# Patient Record
Sex: FEMALE | Race: WHITE
[De-identification: names, ages, dates, MRNs, and addresses within clinical notes are randomized per-mention and may not be internally consistent; named-entity substitution may affect disease eponyms.]

---

## 2018-09-05 ENCOUNTER — HOSPITAL ENCOUNTER (EMERGENCY)
Dept: HOSPITAL 75 - ER | Age: 58
Discharge: HOME | End: 2018-09-05
Payer: COMMERCIAL

## 2018-09-05 VITALS — BODY MASS INDEX: 39.75 KG/M2 | WEIGHT: 216 LBS | HEIGHT: 62 IN

## 2018-09-05 VITALS — DIASTOLIC BLOOD PRESSURE: 74 MMHG | SYSTOLIC BLOOD PRESSURE: 124 MMHG

## 2018-09-05 DIAGNOSIS — R94.5: ICD-10-CM

## 2018-09-05 DIAGNOSIS — R60.0: Primary | ICD-10-CM

## 2018-09-05 LAB
ALBUMIN SERPL-MCNC: 3.2 GM/DL (ref 3.2–4.5)
ALP SERPL-CCNC: 118 U/L (ref 40–136)
ALT SERPL-CCNC: 61 U/L (ref 0–55)
BASOPHILS # BLD AUTO: 0 10^3/UL (ref 0–0.1)
BASOPHILS NFR BLD AUTO: 1 % (ref 0–10)
BILIRUB SERPL-MCNC: 1.2 MG/DL (ref 0.1–1)
BUN/CREAT SERPL: 20
CALCIUM SERPL-MCNC: 8.5 MG/DL (ref 8.5–10.1)
CHLORIDE SERPL-SCNC: 103 MMOL/L (ref 98–107)
CO2 SERPL-SCNC: 20 MMOL/L (ref 21–32)
CREAT SERPL-MCNC: 0.71 MG/DL (ref 0.6–1.3)
EOSINOPHIL # BLD AUTO: 0.2 10^3/UL (ref 0–0.3)
EOSINOPHIL NFR BLD AUTO: 3 % (ref 0–10)
ERYTHROCYTE [DISTWIDTH] IN BLOOD BY AUTOMATED COUNT: 19.6 % (ref 10–14.5)
GFR SERPLBLD BASED ON 1.73 SQ M-ARVRAT: > 60 ML/MIN
GLUCOSE SERPL-MCNC: 102 MG/DL (ref 70–105)
HCT VFR BLD CALC: 39 % (ref 35–52)
HGB BLD-MCNC: 12.7 G/DL (ref 11.5–16)
LYMPHOCYTES # BLD AUTO: 1.7 X 10^3 (ref 1–4)
LYMPHOCYTES NFR BLD AUTO: 24 % (ref 12–44)
MANUAL DIFFERENTIAL PERFORMED BLD QL: NO
MCH RBC QN AUTO: 28 PG (ref 25–34)
MCHC RBC AUTO-ENTMCNC: 33 G/DL (ref 32–36)
MCV RBC AUTO: 86 FL (ref 80–99)
MONOCYTES # BLD AUTO: 0.5 X 10^3 (ref 0–1)
MONOCYTES NFR BLD AUTO: 7 % (ref 0–12)
NEUTROPHILS # BLD AUTO: 4.6 X 10^3 (ref 1.8–7.8)
NEUTROPHILS NFR BLD AUTO: 66 % (ref 42–75)
PLATELET # BLD: 145 10^3/UL (ref 130–400)
PMV BLD AUTO: 10.3 FL (ref 7.4–10.4)
POTASSIUM SERPL-SCNC: 3.7 MMOL/L (ref 3.6–5)
PROT SERPL-MCNC: 6.7 GM/DL (ref 6.4–8.2)
RBC # BLD AUTO: 4.51 10^6/UL (ref 4.35–5.85)
SODIUM SERPL-SCNC: 136 MMOL/L (ref 135–145)
T4 FREE SERPL-MCNC: 1.03 NG/DL (ref 0.7–1.48)
WBC # BLD AUTO: 7 10^3/UL (ref 4.3–11)

## 2018-09-05 PROCEDURE — 36415 COLL VENOUS BLD VENIPUNCTURE: CPT

## 2018-09-05 PROCEDURE — 80053 COMPREHEN METABOLIC PANEL: CPT

## 2018-09-05 PROCEDURE — 71045 X-RAY EXAM CHEST 1 VIEW: CPT

## 2018-09-05 PROCEDURE — 84439 ASSAY OF FREE THYROXINE: CPT

## 2018-09-05 PROCEDURE — 93970 EXTREMITY STUDY: CPT

## 2018-09-05 PROCEDURE — 85025 COMPLETE CBC W/AUTO DIFF WBC: CPT

## 2018-09-05 PROCEDURE — 80320 DRUG SCREEN QUANTALCOHOLS: CPT

## 2018-09-05 PROCEDURE — 83880 ASSAY OF NATRIURETIC PEPTIDE: CPT

## 2018-09-05 PROCEDURE — 84443 ASSAY THYROID STIM HORMONE: CPT

## 2018-09-05 PROCEDURE — 93005 ELECTROCARDIOGRAM TRACING: CPT

## 2018-09-05 NOTE — DIAGNOSTIC IMAGING REPORT
INDICATION: Lower extremity swelling.



COMPARISON: None.



FINDINGS: Single view of the chest demonstrates minimal cardiac

enlargement. The lungs are clear. There is no pneumothorax. The

osseous structures normal.



IMPRESSION: Minimal cardiac enlargement without pulmonary edema

or infiltrate.



Dictated by: 



  Dictated on workstation # [id number]

## 2018-09-05 NOTE — ED LOWER EXTREMITY
General


Stated Complaint:  BILAT LEG SWELLING


Source:  patient, family, EMS


Exam Limitations:  no limitations





History of Present Illness


Date Seen by Provider:  Sep 5, 2018


Time Seen by Provider:  17:56


Initial Comments


To ER per EMS from home with reports of bilateral lower actually swelling for 

the past 4 days. She reports chronic shortness of breath. She just moved here 

to [place] week ago. Prior to this she lived in [place] and prior to that 

she lived in [place]. She had this once before when she lived in [place], was given 

a short course of Lasix and the swelling improved.


Onset:  just prior to arrival


Severity:  moderate


Pain/Injury Location:  bilateral leg


Modifying Factors:  Worse With Movement





Allergies and Home Medications


Home Medications


Furosemide 40 Mg Tablet, 40 MG PO DAILY


   Prescribed by: MUNA CASILLAS on 18 1850





Patient Home Medication List


Home Medication List Reviewed:  Yes





Review of Systems


Constitutional:  see HPI


EENTM:  see HPI


Respiratory:  see HPI


Cardiovascular:  no symptoms reported


Genitourinary:  no symptoms reported


Musculoskeletal:  see HPI


Skin:  no symptoms reported


Psychiatric/Neurological:  No Symptoms Reported (1 the)





Physical Exam


Vital Signs





Vital Signs - First Documented








 18





 17:58


 


Temp 97.9


 


Pulse 94


 


Resp 20


 


B/P (MAP) 124/74 (91)


 


Pulse Ox 96


 


O2 Delivery Room Air





Capillary Refill :


Height, Weight, BMI


Height: '"


Weight: lbs. oz. kg;  BMI


Method:


General Appearance:  WD/WN, no apparent distress


HEENT:  PERRL/EOMI, normal ENT inspection


Neck:  non-tender, full range of motion


Cardiovascular:  regular rate, rhythm, no murmur


Respiratory:  no respiratory distress, no accessory muscle use


Gastrointestinal:  normal bowel sounds, non tender, soft


Hips:  bilateral hip non-tender, bilateral hip normal inspection, bilateral hip 

normal range of motion


Legs:  bilateral leg non-tender, bilateral leg normal inspection, bilateral leg 

normal range of motion


Knees:  bilateral knee non-tender, bilateral knee normal inspection, bilateral 

knee normal range of motion


Ankles:  left ankle swelling (2+BLE)


Feet:  bilateral foot non-tender, bilateral foot normal inspection, bilateral 

foot normal range of motion, bilateral foot other (dorsalis pedis pulse +2 

bilaterally)


Neurologic/Psychiatric:  alert, normal mood/affect, oriented x 3


Skin:  normal color, warm/dry





Progress/Results/Core Measures


Results/Orders


Lab Results





Laboratory Tests








Test


 18


18:12 Range/Units


 


 


White Blood Count


 7.0 


 4.3-11.0


10^3/uL


 


Red Blood Count


 4.51 


 4.35-5.85


10^6/uL


 


Hemoglobin 12.7  11.5-16.0  G/DL


 


Hematocrit 39  35-52  %


 


Mean Corpuscular Volume 86  80-99  FL


 


Mean Corpuscular Hemoglobin 28  25-34  PG


 


Mean Corpuscular Hemoglobin


Concent 33 


 32-36  G/DL





 


Red Cell Distribution Width 19.6 H 10.0-14.5  %


 


Platelet Count


 145 


 130-400


10^3/uL


 


Mean Platelet Volume 10.3  7.4-10.4  FL


 


Neutrophils (%) (Auto) 66  42-75  %


 


Lymphocytes (%) (Auto) 24  12-44  %


 


Monocytes (%) (Auto) 7  0-12  %


 


Eosinophils (%) (Auto) 3  0-10  %


 


Basophils (%) (Auto) 1  0-10  %


 


Neutrophils # (Auto) 4.6  1.8-7.8  X 10^3


 


Lymphocytes # (Auto) 1.7  1.0-4.0  X 10^3


 


Monocytes # (Auto) 0.5  0.0-1.0  X 10^3


 


Eosinophils # (Auto)


 0.2 


 0.0-0.3


10^3/uL


 


Basophils # (Auto)


 0.0 


 0.0-0.1


10^3/uL


 


Sodium Level 136  135-145  MMOL/L


 


Potassium Level 3.7  3.6-5.0  MMOL/L


 


Chloride Level 103    MMOL/L


 


Carbon Dioxide Level 20 L 21-32  MMOL/L


 


Anion Gap 13  5-14  MMOL/L


 


Blood Urea Nitrogen 14  7-18  MG/DL


 


Creatinine


 0.71 


 0.60-1.30


MG/DL


 


Estimat Glomerular Filtration


Rate > 60 


  





 


BUN/Creatinine Ratio 20   


 


Glucose Level 102    MG/DL


 


Calcium Level 8.5  8.5-10.1  MG/DL


 


Corrected Calcium 9.1  8.5-10.1  MG/DL


 


Total Bilirubin 1.2 H 0.1-1.0  MG/DL


 


Aspartate Amino Transf


(AST/SGOT) 107 H


 5-34  U/L





 


Alanine Aminotransferase


(ALT/SGPT) 61 H


 0-55  U/L





 


Alkaline Phosphatase 118    U/L


 


B-Type Natriuretic Peptide 122.6 H <100.0  PG/ML


 


Total Protein 6.7  6.4-8.2  GM/DL


 


Albumin 3.2  3.2-4.5  GM/DL


 


Thyroid Stimulating Hormone


(TSH) 1.39 


 0.35-4.94


UIU/ML


 


Free Thyroxine


 1.03 


 0.70-1.48


NG/DL


 


Serum Alcohol < 10  <10  MG/DL








My Orders





Orders - MUNA CASILLAS


BNP (18 17:55)


Thyroid Stimulating Hormone (18 17:55)


Free T4 (Free Thyroxine) (18 17:55)


Ekg Tracing (18 17:55)


Chest 1 View, Ap/Pa Only (18 17:55)


Cbc With Automated Diff (18 17:55)


Comprehensive Metabolic Panel (18 17:55)


Us Venous Lower Ext Daniel (18 17:55)


Iv Heplock-Insert (Order) (18 17:55)


Alcohol (18 18:46)





Vital Signs/I&O











 18





 17:58


 


Temp 97.9


 


Pulse 94


 


Resp 20


 


B/P (MAP) 124/74 (91)


 


Pulse Ox 96


 


O2 Delivery Room Air











Diagnostic Imaging





   Diagonstic Imaging:  Xray


Comments


NAME:   KIT HARGROVE


[facility] REC#:   [id number]


ACCOUNT#:   [id number]


PT STATUS:   REG ER


:   1960


PHYSICIAN:   MUNA CASILLAS


ADMIT DATE:   18/ER


 ***Signed***


Date of Exam:18





CHEST 1 VIEW, AP/PA ONLY








INDICATION: Lower extremity swelling.





COMPARISON: None.





FINDINGS: Single view of the chest demonstrates minimal cardiac


enlargement. The lungs are clear. There is no pneumothorax. The


osseous structures normal.





IMPRESSION: Minimal cardiac enlargement without pulmonary edema


or infiltrate.





Dictated by: 





  Dictated on workstation # [id number]








Dict:   18


Trans:   18


 [id number]





Interpreted by:     FLAKITO JACOBS


Electronically signed by: FLAKITO JACOBS 18





Departure


Impression





 Primary Impression:  


 Pedal edema


 Additional Impression:  


 Elevated LFTs


Disposition:  01 HOME, SELF-CARE


Condition:  Stable





Departure-Patient Inst.


Decision time for Depature:  18:47


Referrals:  


UNKNOWN (PCP/Family)


Primary Care Physician


Patient Instructions:  Dependent Edema (DC)





Add. Discharge Instructions:  


1. A list of local physicians is been provided to you. Call one of these to 

make an appointment to be seen within the next week. Elevate your legs as much 

as possible. Take the diuretic once daily for the next 3 days starting 

tomorrow. Follow-up with a physician to elevate your slightly elevated liver 

studies.


Scripts


Furosemide (Lasix) 40 Mg Tablet


40 MG PO DAILY, #3 TAB


   Prov: MUNA CASILLAS         18


Work/School Note:  Local Medical Staff Listing











MUNA CASILLAS Sep 5, 2018 17:58

## 2018-09-05 NOTE — DIAGNOSTIC IMAGING REPORT
EXAM:  US VENOUS LOWER EXT SHAI



INDICATION:  Bilateral lower extremity edema.



COMPARISON:  None.



TECHNIQUE:  Duplex, gray-scale and color-flow imaging of the

bilateral lower extremities venous system was performed



FINDINGS:  The bilateral common femoral vein, superficial femoral

vein, profunda femoris, and popliteal veins are normal.  These

vessels show normal compressibility, color flow, and doppler

augmentation.  The deep calf veins demonstrate no distinct

intraluminal thrombus where seen. 



IMPRESSION:  Negative venous Doppler of the  bilateral lower

extremities.



Dictated by: 



  Dictated on workstation # [id number]

## 2018-09-15 ENCOUNTER — HOSPITAL ENCOUNTER (INPATIENT)
Dept: HOSPITAL 75 - ER | Age: 58
LOS: 5 days | Discharge: HOME | DRG: 871 | End: 2018-09-20
Attending: INTERNAL MEDICINE | Admitting: INTERNAL MEDICINE
Payer: COMMERCIAL

## 2018-09-15 VITALS — DIASTOLIC BLOOD PRESSURE: 69 MMHG | SYSTOLIC BLOOD PRESSURE: 120 MMHG

## 2018-09-15 VITALS — HEIGHT: 62 IN | WEIGHT: 226 LBS | BODY MASS INDEX: 41.59 KG/M2

## 2018-09-15 VITALS — SYSTOLIC BLOOD PRESSURE: 122 MMHG | DIASTOLIC BLOOD PRESSURE: 70 MMHG

## 2018-09-15 DIAGNOSIS — R18.8: ICD-10-CM
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
## 2018-09-15 DIAGNOSIS — F32.9: ICD-10-CM

## 2018-09-15 DIAGNOSIS — K76.6: ICD-10-CM

## 2018-09-15 DIAGNOSIS — R53.81: ICD-10-CM

## 2018-09-15 DIAGNOSIS — R29.6: ICD-10-CM

## 2018-09-15 DIAGNOSIS — J44.9: ICD-10-CM

## 2018-09-15 DIAGNOSIS — A40.3: Primary | ICD-10-CM

## 2018-09-15 DIAGNOSIS — L30.9: ICD-10-CM

## 2018-09-15 DIAGNOSIS — I10: ICD-10-CM

## 2018-09-15 DIAGNOSIS — K74.60: ICD-10-CM

## 2018-09-15 DIAGNOSIS — E66.9: ICD-10-CM

## 2018-09-15 DIAGNOSIS — K44.9: ICD-10-CM

## 2018-09-15 DIAGNOSIS — Z79.4: ICD-10-CM

## 2018-09-15 DIAGNOSIS — R51: ICD-10-CM

## 2018-09-15 DIAGNOSIS — F12.90: ICD-10-CM

## 2018-09-15 DIAGNOSIS — K29.70: ICD-10-CM

## 2018-09-15 DIAGNOSIS — D69.59: ICD-10-CM

## 2018-09-15 DIAGNOSIS — R35.0: ICD-10-CM

## 2018-09-15 DIAGNOSIS — B19.10: ICD-10-CM

## 2018-09-15 DIAGNOSIS — F41.9: ICD-10-CM

## 2018-09-15 DIAGNOSIS — D18.09: ICD-10-CM

## 2018-09-15 DIAGNOSIS — S00.83XA: ICD-10-CM

## 2018-09-15 DIAGNOSIS — K65.2: ICD-10-CM

## 2018-09-15 DIAGNOSIS — E11.9: ICD-10-CM

## 2018-09-15 LAB
ALBUMIN FLD-MCNC: 0.5 G/DL
ALBUMIN SERPL-MCNC: 3.2 GM/DL (ref 3.2–4.5)
ALP SERPL-CCNC: 129 U/L (ref 40–136)
ALT SERPL-CCNC: 66 U/L (ref 0–55)
ANISOCYTOSIS BLD QL SMEAR: SLIGHT
APPEARANCE FLD: (no result)
APTT PPP: (no result) S
BACTERIA #/AREA URNS HPF: (no result) /HPF
BASOPHILS # BLD AUTO: 0 10^3/UL (ref 0–0.1)
BASOPHILS NFR BLD AUTO: 0 % (ref 0–10)
BASOPHILS NFR BLD MANUAL: 0 %
BILIRUB SERPL-MCNC: 1.8 MG/DL (ref 0.1–1)
BILIRUB UR QL STRIP: (no result)
BODY FLUID SOURCE: (no result)
BUN/CREAT SERPL: 20
CALCIUM SERPL-MCNC: 9 MG/DL (ref 8.5–10.1)
CHLORIDE SERPL-SCNC: 99 MMOL/L (ref 98–107)
CO2 SERPL-SCNC: 21 MMOL/L (ref 21–32)
COLOR FLD: YELLOW
CREAT SERPL-MCNC: 0.9 MG/DL (ref 0.6–1.3)
EOSINOPHIL # BLD AUTO: 0 10^3/UL (ref 0–0.3)
EOSINOPHIL NFR BLD AUTO: 0 % (ref 0–10)
EOSINOPHIL NFR BLD MANUAL: 0 %
ERYTHROCYTE [DISTWIDTH] IN BLOOD BY AUTOMATED COUNT: 20.1 % (ref 10–14.5)
FIBRINOGEN PPP-MCNC: (no result) MG/DL
GFR SERPLBLD BASED ON 1.73 SQ M-ARVRAT: > 60 ML/MIN
GLUCOSE FLD-MCNC: 175 MG/DL
GLUCOSE SERPL-MCNC: 158 MG/DL (ref 70–105)
GLUCOSE UR STRIP-MCNC: NEGATIVE MG/DL
HCT VFR BLD CALC: 39 % (ref 35–52)
HGB BLD-MCNC: 12.9 G/DL (ref 11.5–16)
HYALINE CASTS #/AREA URNS LPF: (no result) /LPF
INR PPP: 1.5 (ref 0.8–1.4)
KETONES UR QL STRIP: (no result)
LDH FLD-CCNC: 66 U/L
LEUKOCYTE ESTERASE UR QL STRIP: (no result)
LYMPHOCYTES # BLD AUTO: 0.9 X 10^3 (ref 1–4)
LYMPHOCYTES NFR BLD AUTO: 5 % (ref 12–44)
LYMPHOCYTES NFR FLD MANUAL: 12 %
MANUAL DIFFERENTIAL PERFORMED BLD QL: YES
MCH RBC QN AUTO: 28 PG (ref 25–34)
MCHC RBC AUTO-ENTMCNC: 33 G/DL (ref 32–36)
MCV RBC AUTO: 86 FL (ref 80–99)
MONOCYTES # BLD AUTO: 0.9 X 10^3 (ref 0–1)
MONOCYTES NFR BLD AUTO: 5 % (ref 0–12)
MONOCYTES NFR BLD: 7 %
NEUTROPHILS # BLD AUTO: 16.3 X 10^3 (ref 1.8–7.8)
NEUTROPHILS NFR BLD AUTO: 90 % (ref 42–75)
NEUTS BAND NFR BLD MANUAL: 79 %
NEUTS BAND NFR BLD: 5 %
NITRITE UR QL STRIP: NEGATIVE
PH UR STRIP: 6 [PH] (ref 5–9)
PLATELET # BLD: 137 10^3/UL (ref 130–400)
PMV BLD AUTO: 10.9 FL (ref 7.4–10.4)
POTASSIUM SERPL-SCNC: 3.6 MMOL/L (ref 3.6–5)
PROT FLD-MCNC: 0.8 G/DL
PROT SERPL-MCNC: 7.1 GM/DL (ref 6.4–8.2)
PROT UR QL STRIP: (no result)
PROTHROMBIN TIME: 17.9 SEC (ref 12.2–14.7)
RBC # BLD AUTO: 4.57 10^6/UL (ref 4.35–5.85)
RBC # FLD: 41 /UL
RBC #/AREA URNS HPF: (no result) /HPF
SODIUM SERPL-SCNC: 134 MMOL/L (ref 135–145)
SP GR UR STRIP: 1.02 (ref 1.02–1.02)
SQUAMOUS #/AREA URNS HPF: (no result) /HPF
UROBILINOGEN UR-MCNC: 4 MG/DL
VARIANT LYMPHS NFR BLD MANUAL: 9 %
WBC # BLD AUTO: 18 10^3/UL (ref 4.3–11)
WBC # FLD: 479 /UL
WBC #/AREA URNS HPF: (no result) /HPF

## 2018-09-15 PROCEDURE — 89051 BODY FLUID CELL COUNT: CPT

## 2018-09-15 PROCEDURE — 84157 ASSAY OF PROTEIN OTHER: CPT

## 2018-09-15 PROCEDURE — 85007 BL SMEAR W/DIFF WBC COUNT: CPT

## 2018-09-15 PROCEDURE — 71045 X-RAY EXAM CHEST 1 VIEW: CPT

## 2018-09-15 PROCEDURE — 86706 HEP B SURFACE ANTIBODY: CPT

## 2018-09-15 PROCEDURE — 87077 CULTURE AEROBIC IDENTIFY: CPT

## 2018-09-15 PROCEDURE — 82945 GLUCOSE OTHER FLUID: CPT

## 2018-09-15 PROCEDURE — 85025 COMPLETE CBC W/AUTO DIFF WBC: CPT

## 2018-09-15 PROCEDURE — 86803 HEPATITIS C AB TEST: CPT

## 2018-09-15 PROCEDURE — 82140 ASSAY OF AMMONIA: CPT

## 2018-09-15 PROCEDURE — 81000 URINALYSIS NONAUTO W/SCOPE: CPT

## 2018-09-15 PROCEDURE — 96375 TX/PRO/DX INJ NEW DRUG ADDON: CPT

## 2018-09-15 PROCEDURE — 85027 COMPLETE CBC AUTOMATED: CPT

## 2018-09-15 PROCEDURE — 85610 PROTHROMBIN TIME: CPT

## 2018-09-15 PROCEDURE — 74177 CT ABD & PELVIS W/CONTRAST: CPT

## 2018-09-15 PROCEDURE — 87070 CULTURE OTHR SPECIMN AEROBIC: CPT

## 2018-09-15 PROCEDURE — 80048 BASIC METABOLIC PNL TOTAL CA: CPT

## 2018-09-15 PROCEDURE — 87205 SMEAR GRAM STAIN: CPT

## 2018-09-15 PROCEDURE — 36415 COLL VENOUS BLD VENIPUNCTURE: CPT

## 2018-09-15 PROCEDURE — 87040 BLOOD CULTURE FOR BACTERIA: CPT

## 2018-09-15 PROCEDURE — 87184 SC STD DISK METHOD PER PLATE: CPT

## 2018-09-15 PROCEDURE — 70450 CT HEAD/BRAIN W/O DYE: CPT

## 2018-09-15 PROCEDURE — 72125 CT NECK SPINE W/O DYE: CPT

## 2018-09-15 PROCEDURE — 83605 ASSAY OF LACTIC ACID: CPT

## 2018-09-15 PROCEDURE — 96365 THER/PROPH/DIAG IV INF INIT: CPT

## 2018-09-15 PROCEDURE — 0W9G3ZX DRAINAGE OF PERITONEAL CAVITY, PERCUTANEOUS APPROACH, DIAGNOSTIC: ICD-10-PCS | Performed by: SURGERY

## 2018-09-15 PROCEDURE — 82042 OTHER SOURCE ALBUMIN QUAN EA: CPT

## 2018-09-15 PROCEDURE — 87340 HEPATITIS B SURFACE AG IA: CPT

## 2018-09-15 PROCEDURE — 80053 COMPREHEN METABOLIC PANEL: CPT

## 2018-09-15 PROCEDURE — 82962 GLUCOSE BLOOD TEST: CPT

## 2018-09-15 PROCEDURE — 83615 LACTATE (LD) (LDH) ENZYME: CPT

## 2018-09-15 RX ADMIN — SODIUM CHLORIDE SCH MLS/HR: 900 INJECTION, SOLUTION INTRAVENOUS at 18:32

## 2018-09-15 RX ADMIN — PANTOPRAZOLE SODIUM SCH MG: 40 INJECTION, POWDER, FOR SOLUTION INTRAVENOUS at 20:36

## 2018-09-15 RX ADMIN — Medication SCH ML: at 22:31

## 2018-09-15 RX ADMIN — FENTANYL CITRATE PRN MCG: 50 INJECTION, SOLUTION INTRAMUSCULAR; INTRAVENOUS at 23:06

## 2018-09-15 RX ADMIN — MICONAZOLE NITRATE SCH GM: 20 POWDER TOPICAL at 22:32

## 2018-09-15 RX ADMIN — IBUPROFEN PRN MG: 800 TABLET, FILM COATED ORAL at 20:36

## 2018-09-15 RX ADMIN — FENTANYL CITRATE PRN MCG: 50 INJECTION, SOLUTION INTRAMUSCULAR; INTRAVENOUS at 20:36

## 2018-09-15 NOTE — DIAGNOSTIC IMAGING REPORT
EXAM: Chest 1 view, AP/PA only.



INDICATION: Fall.



COMPARISON: Chest radiograph, 9/5/2018.



FINDINGS: Low lung volumes. Mild atelectasis or infiltrate in the

right lung base. Normal heart size and pulmonary vascularity. No

pleural effusion or pneumothorax. Postoperative changes in the

lower cervical spine. No acute osseous findings.



IMPRESSION: 

1. Low lung volumes with mild atelectasis or infiltrate in the

right lung base.

2. Remainder unremarkable. 



Dictated by: 



  Dictated on workstation # [id number]

## 2018-09-15 NOTE — DIAGNOSTIC IMAGING REPORT
PROCEDURE: CT head and CT cervical spine without contrast.



TECHNIQUE: Multiple contiguous axial images were obtained through

the brain and cervical spine without the use of intravenous

contrast. Sagittal and coronal reformations through the cervical

spine were then performed.



INDICATION: Fall. Head injury. 



COMPARISON: None.



FINDINGS: Examination is limited by motion artifact.



CT head: No CT evidence for territorial infarction. No

intracranial hemorrhage, mass effect, hydrocephalus or

extra-axial fluid collection. Intracranial vascular

calcifications. Osseous structures are intact. The paranasal

sinuses and mastoids are clear.



CT cervical spine: There are postoperative findings of an

anterior fusion with interbody bone grafting at C4-C6. Normal

alignment. Vertebral body heights are preserved. No fractures.

Advanced degenerative endplate changes at C3-C4 and C6-C7.

Osteophytic ridging appears to result in at least mild, possibly

moderate spinal canal narrowing at C5-C6. There is diffuse

moderate neural foraminal narrowing. Advanced atherosclerotic

calcifications in the carotid bifurcations, left greater than

right. The lung apices are clear.



IMPRESSION: 

1. Examination is limited by motion artifact.

2. Given the limitations, no acute intracranial or cervical spine

CT findings. Chronic findings as above.



Dictated by: 



  Dictated on workstation # [id number]

## 2018-09-15 NOTE — CONSULTATION
History of Present Illness


History of Present Illness


Patient Consulted On(richi/time)


9/15/18


 21:38


Date Seen by Provider:  Sep 15, 2018


Time Seen by Provider:  14:00


History of Present Illness


seen and evaluated in ED..





Consult requested by Nikhil Matthew for paracentesis, abdominal pain.





Patient is a 58 year old female who has not felt well last 4-5 days.  SHe 

states that she has has several falls and had another one prior to coming 

today.  Patient states last day she has been having fever.  She has abdominal 

pain thats all over.  DOes not move anywhere.  Nothing makes better and nothing 

makes worse.  Patient has severe headache. She has noticed increased abdominal 

girth.  She had a ct scan which was reviewed, and demonstrating  stomach 

changes with inflammation suggestive of gastritis, and maybe intramural air or 

possible neoplasm stomach/duodenum, moderate ascites.  Patient states has 

hepatitis, no local physician.





Allergies and Home Medications


Allergies


Coded Allergies:  


     Penicillins (Verified  Allergy, Unknown, 9/15/18)


     doxycycline (Verified  Allergy, Unknown, 9/15/18)


     levofloxacin (Verified  Allergy, Unknown, 9/15/18)





Home Medications


Adalimumab 40 Mg/0.4 Ml Pen.ij.kit, 40 MG SQ Sa, (Reported)


Albuterol Sulfate 1 Puff Puff, 2 PUFF IH TID PRN for SHORTNESS OF BREATH, (

Reported)


   1 PUFF = 90 MCG 


Amitriptyline HCl 50 Mg Tablet, 50 MG PO HS, (Reported)


Aspirin 81 Mg Tablet.dr, 81 MG PO DAILY, (Reported)


Buspirone HCl 15 Mg Tablet, 15 MG PO BID, (Reported)


Cefdinir 300 Mg Capsule, 300 MG PO BID


   Prescribed by: JOHN SLADE on 18 1150


Donepezil HCl 10 Mg Tablet, 10 MG PO DAILY, (Reported)


Hydrocodone/Acetaminophen 1 Each Tablet, 1 TAB PO QID PRN for PAIN-MODERATE


   Prescribed by: JOHN SLADE on 18 1537


Leflunomide 20 Mg Tablet, 20 MG PO Q48H, (Reported)


Losartan Potassium 25 Mg Tablet, 25 MG PO DAILY, (Reported)


Lovastatin 40 Mg Tablet, 40 MG PO HS, (Reported)


Pantoprazole Sodium 20 Mg Tablet.dr, 20 MG PO DAILY, (Reported)


Promethazine HCl 25 Mg Tablet, 25 MG PO Q6H PRN for NAUSEA/VOMITING-1ST LINE, (

Reported)


Rizatriptan Benzoate 10 Mg Tablet, 10 MG PO UD PRN for MIGRAINE, (Reported)


Sertraline HCl 100 Mg Tablet, 150 MG PO DAILY, (Reported)


   TAKES 1 & 1/2 (100MG) TABLETS 





Patient Home Medication List


Home Medication List Reviewed:  Yes





Past Medical-Social-Family Hx


Patient Social History


Alcohol Use:  Denies Use


Recreational Drug Use:  Yes (SMOKES MARIJUANA DAILY)


Drug of Choice:  MARIJUANA


Smoking Status:  Never a Smoker


Type Used:  Cigarettes


Recent Foreign Travel:  No


Contact w/Someone Who Travel:  No


Recent Infectious Disease Expo:  No


Recent Hopitalizations:  No


Physical Abuse Screen:  No


Sexual Abuse:  No





Seasonal Allergies


Seasonal Allergies:  No





Surgeries


History of Surgeries:  Yes (BI LAT KNEES, RT FOOT, NECK)


Surgeries:   Section, Orthopedic





Respiratory


History of Respiratory Disorde:  Yes


Respiratory Disorders:  COPD





Cardiovascular


History of Cardiac Disorders:  Yes


Cardiac Disorders:  Hypertension





Neurological


History of Neurological Disord:  No





Genitourinary


History of Genitourinary Disor:  Yes (FREQUENCY)





Gastrointestinal


History of Gastrointestinal Di:  Yes (Hepatitis B)


Gastrointestinal Disorders:  Hepatitis





Musculoskeletal


History of Musculoskeletal Dis:  No





Endocrine


History of Endocrine Disorders:  Yes


Endocrine Disorders:  Diabetes, Insulin dep





HEENT


History of HEENT Disorders:  No


Loss of Vision:  Denies


Hearing Impairment:  Denies





Cancer


History of Cancer:  No





Psychosocial


History of Psychiatric Problem:  Yes


Behavioral Health Disorders:  Anxiety, Depression





Integumentary


History of Skin or Integumenta:  Yes


Skin/Integumentary Disorders:  Eczema





Family Medical History


Significant Family History:  No Pertinent Family Hx





Review of Systems-General


Constitutional:  see HPI


EENTM:  no symptoms reported


Respiratory:  no symptoms reported


Cardiovascular:  no symptoms reported


Gastrointestinal:  see HPI


Genitourinary:  no symptoms reported


Musculoskeletal:  no symptoms reported


Skin:  no symptoms reported


Psychiatric/Neurological:  No Symptoms Reported





Physical Exam-General Problems


Physical Exam


Vital Signs





Vital Signs - First Documented




















Capillary Refill : Less Than 3 SecondsLess Than 3 Seconds


General Appearance:  no apparent distress (laying in bed)


HEENT:  PERRL/EOMI


Neck:  supple, normal inspection


Respiratory:  no respiratory distress, no accessory muscle use


Cardiovascular:  regular rate, rhythm


Gastrointestinal:  distended, other (fluid wave, tenderness left upper quadrant.

)


Rectal:  deferred


Back:  normal inspection


Extremities:  non-tender


Neurologic/Psychiatric:  CNs II-XII nml as tested, no motor/sensory deficits, 

alert, normal mood/affect, oriented x 3


Skin:  normal color, warm/dry


Lymphatic:  no adenopathy





Data Review


Labs


Laboratory Tests


9/15/18 13:30: 


Urine Color AMBERH, Urine Clarity SLIGHTLY CLOUDY, Urine pH 6, Urine Specific 

Gravity 1.020, Urine Protein 2+H, Urine Glucose (UA) NEGATIVE, Urine Ketones 1+H

, Urine Nitrite NEGATIVE, Urine Bilirubin 2+H, Urine Urobilinogen 4H, Urine 

Leukocyte Esterase 1+H, Urine RBC (Auto) 3+H, Urine RBC 5-10H, Urine WBC 2-5, 

Urine Squamous Epithelial Cells 2-5, Urine Crystals NONE, Urine Bacteria FEWH, 

Urine Casts PRESENT, Urine Hyaline Casts 2-5H, Urine Mucus MODERATEH, Urine 

Culture Indicated NO


9/15/18 13:48: 


White Blood Count 18.0H, Red Blood Count 4.57, Hemoglobin 12.9, Hematocrit 39, 

Mean Corpuscular Volume 86, Mean Corpuscular Hemoglobin 28, Mean Corpuscular 

Hemoglobin Concent 33, Red Cell Distribution Width 20.1H, Platelet Count 137, 

Mean Platelet Volume 10.9H, Neutrophils (%) (Auto) 90H, Lymphocytes (%) (Auto) 

5L, Monocytes (%) (Auto) 5, Eosinophils (%) (Auto) 0, Basophils (%) (Auto) 0, 

Neutrophils # (Auto) 16.3H, Lymphocytes # (Auto) 0.9L, Monocytes # (Auto) 0.9, 

Eosinophils # (Auto) 0.0, Basophils # (Auto) 0.0, Neutrophils % (Manual) 79, 

Lymphocytes % (Manual) 9, Monocytes % (Manual) 7, Eosinophils % (Manual) 0, 

Basophils % (Manual) 0, Band Neutrophils 5, Anisocytosis SLIGHT, Prothrombin 

Time 17.9H, INR Comment 1.5H, Sodium Level 134L, Potassium Level 3.6, Chloride 

Level 99, Carbon Dioxide Level 21, Anion Gap 14, Blood Urea Nitrogen 18, 

Creatinine 0.90, Estimat Glomerular Filtration Rate > 60, BUN/Creatinine Ratio 

20, Glucose Level 158H, Lactic Acid Level 3.23*H, Calcium Level 9.0, Corrected 

Calcium 9.6, Total Bilirubin 1.8H, Aspartate Amino Transf (AST/SGOT) 112H, 

Alanine Aminotransferase (ALT/SGPT) 66H, Alkaline Phosphatase 129, Total 

Protein 7.1, Albumin 3.2


9/15/18 16:10: 


Body Fluid Source PERITONEAL, Body Fluid Color YELLOW, Body Fluid Appearance 

HAZY, Body Fluid , Body Fluid RBC 41, Body Fluid Polynuclear WBCs 35, 

Body Fluid Mononuclear WBCs 53, Body Fluid Lymphocytes 12, Body Fluid Other 

Cells , Body Fluid Glucose 175, Body Fluid Total Protein 0.8, Body Fluid 

Albumin 0.5, Body Fluid Lactate Dehydrogenase 66


9/15/18 17:18: Lactic Acid Level 2.03*H





Assessment/Plan


LUQ Abdominal pain


ascites


gastritis versus neoplasm with possible intramural air by ct


Consideration sbp and asked to do paracentesis which was performed and took off 

1860 mL of straw colored fluid.


Protonix 40 mg bid IV


IV abx to cover for sbp


will follow


will need EGD  in near future to further evaluate.





Clinical Quality Measures


DVT/VTE Risk/Contraindication:


Risk Factor Score Per Nursin


RFS Level Per Nursing on Admit:  4+=Very High











DAVID ARANDA DO Sep 15, 2018 21:46

## 2018-09-15 NOTE — ED GENERAL
General


Stated Complaint:  FALL


Source of Information:  Patient


Exam Limitations:  No Limitations





History of Present Illness


Date Seen by Provider:  Sep 15, 2018


Time Seen by Provider:  13:46


Initial Comments


To ER per EMS from home with reports of frequent falls about 6 times over the 

past 4-5 days. She states that she always falls because she feels as though her 

legs give out on her. However she's been falling more recently. She just moved 

to the area a few months ago and does not have a primary care provider. 

Formerly from [facility]. She states that she did hit her head during 

one of the falls, has a severe headache. Also reports left upper abdominal 

pain. She reports her pain at 10 out of 10 in her head, abdomen, back and 

states that she always has pain.


Timing/Duration:  1-2 Days


Severity:  Moderate


Associated Systoms:  Headaches, Weakness





Allergies and Home Medications


Allergies


Coded Allergies:  


     Penicillins (Verified  Allergy, Unknown, 9/15/18)


     doxycycline (Verified  Allergy, Unknown, 9/15/18)


     levofloxacin (Verified  Allergy, Unknown, 9/15/18)





Home Medications


Furosemide 40 Mg Tablet, 40 MG PO DAILY


   Prescribed by: MUNA CASILLAS on 18 2705





Patient Home Medication List


Home Medication List Reviewed:  Yes





Review of Systems


Review of Systems


Constitutional:  see HPI


EENTM:  see HPI


Respiratory:  no symptoms reported


Cardiovascular:  no symptoms reported


Gastrointestinal:  abdominal pain


Genitourinary:  see HPI


Musculoskeletal:  no symptoms reported


Skin:  no symptoms reported


Psychiatric/Neurological:  No Symptoms Reported, Headache


Hematologic/Lymphatic:  No Symptoms Reported


Immunological/Allergic:  no symptoms reported





Past Medical-Social-Family Hx


Patient Social History


Type Used:  Cigarettes


Recent Hopitalizations:  No





Seasonal Allergies


Seasonal Allergies:  No





Past Medical History


Surgeries:  Yes (BI LAT KNEES, RT FOOT, NECK)


 Section, Orthopedic


Respiratory:  Yes


COPD


Cardiac:  Yes


Hypertension


Genitourinary:  Yes (FREQUENCY)


Gastrointestinal:  Yes (Hepatitis B)


Endocrine:  Yes


Diabetes, Insulin dep


Cancer:  No


Psychosocial:  Yes


Anxiety, Depression





Physical Exam


Vital Signs





Vital Signs - First Documented








 9/15/18





 13:33


 


Temp 101.1


 


Pulse 93


 


Resp 20


 


B/P (MAP) 145/77 (99)


 


Pulse Ox 94


 


O2 Delivery Room Air





Capillary Refill :


Height, Weight, BMI


Height: 5'2.00"


Weight: 216lbs. oz. 97.479498jj;  BMI


Method:Stated


General Appearance:  No Apparent Distress, WD/WN


Eyes:  Bilateral Eye Normal Inspection, Bilateral Eye PERRL


HEENT:  PERRL/EOMI, TMs Normal, Other (small ecchymosis to the right side of 

the forehead. No Winn sign or raccoon eyes)


Neck:  Full Range of Motion, Normal Inspection


Respiratory:  Normal Breath Sounds, No Accessory Muscle Use, No Respiratory 

Distress


Cardiovascular:  Regular Rate, Rhythm, Normal Peripheral Pulses


Gastrointestinal:  Normal Bowel Sounds, Non Tender, Soft


Extremity:  Normal Capillary Refill, Normal Inspection


Neurologic/Psychiatric:  Alert, Oriented x3, No Motor/Sensory Deficits


Skin:  Normal Color, Warm/Dry





Focused Exam


Lactate Level


9/15/18 13:48: Lactic Acid Level 3.23*H





Lactic Acid Level





Laboratory Tests








Test


 9/15/18


13:48


 


Lactic Acid Level


 3.23 MMOL/L


(0.50-2.00)  *H











Progress/Results/Core Measures


Suspected Sepsis


SIRS


Temperature: 


Pulse:  


Respiratory Rate: 


 


Laboratory Tests


9/15/18 13:48: White Blood Count 18.0H


Blood Pressure  / 


Mean: 


 





9/15/18 13:48: Lactic Acid Level 3.23*H


Laboratory Tests


9/15/18 13:48: 


Creatinine 0.90, INR Comment 1.5H, Platelet Count 137, Total Bilirubin 1.8H








Results/Orders


Lab Results





Laboratory Tests








Test


 9/15/18


13:30 9/15/18


13:48 9/15/18


16:10 Range/Units


 


 


Urine Color BILLY H    


 


Urine Clarity


 SLIGHTLY


CLOUDY 


 


  





 


Urine pH 6    5-9  


 


Urine Specific Gravity 1.020    1.016-1.022  


 


Urine Protein 2+ H   NEGATIVE  


 


Urine Glucose (UA) NEGATIVE    NEGATIVE  


 


Urine Ketones 1+ H   NEGATIVE  


 


Urine Nitrite NEGATIVE    NEGATIVE  


 


Urine Bilirubin 2+ H   NEGATIVE  


 


Urine Urobilinogen 4 H   NORMAL  MG/DL


 


Urine Leukocyte Esterase 1+ H   NEGATIVE  


 


Urine RBC (Auto) 3+ H   NEGATIVE  


 


Urine RBC 5-10 H    /HPF


 


Urine WBC 2-5     /HPF


 


Urine Squamous Epithelial


Cells 2-5 


 


 


  /HPF





 


Urine Crystals NONE     /LPF


 


Urine Bacteria FEW H    /HPF


 


Urine Casts PRESENT     /LPF


 


Urine Hyaline Casts 2-5 H    /LPF


 


Urine Mucus MODERATE H    /LPF


 


Urine Culture Indicated NO     


 


White Blood Count


 


 18.0 H


 


 4.3-11.0


10^3/uL


 


Red Blood Count


 


 4.57 


 


 4.35-5.85


10^6/uL


 


Hemoglobin  12.9   11.5-16.0  G/DL


 


Hematocrit  39   35-52  %


 


Mean Corpuscular Volume  86   80-99  FL


 


Mean Corpuscular Hemoglobin  28   25-34  PG


 


Mean Corpuscular Hemoglobin


Concent 


 33 


 


 32-36  G/DL





 


Red Cell Distribution Width  20.1 H  10.0-14.5  %


 


Platelet Count


 


 137 


 


 130-400


10^3/uL


 


Mean Platelet Volume  10.9 H  7.4-10.4  FL


 


Neutrophils (%) (Auto)  90 H  42-75  %


 


Lymphocytes (%) (Auto)  5 L  12-44  %


 


Monocytes (%) (Auto)  5   0-12  %


 


Eosinophils (%) (Auto)  0   0-10  %


 


Basophils (%) (Auto)  0   0-10  %


 


Neutrophils # (Auto)  16.3 H  1.8-7.8  X 10^3


 


Lymphocytes # (Auto)  0.9 L  1.0-4.0  X 10^3


 


Monocytes # (Auto)  0.9   0.0-1.0  X 10^3


 


Eosinophils # (Auto)


 


 0.0 


 


 0.0-0.3


10^3/uL


 


Basophils # (Auto)


 


 0.0 


 


 0.0-0.1


10^3/uL


 


Neutrophils % (Manual)  79    %


 


Lymphocytes % (Manual)  9    %


 


Monocytes % (Manual)  7    %


 


Eosinophils % (Manual)  0    %


 


Basophils % (Manual)  0    %


 


Band Neutrophils  5    %


 


Anisocytosis  SLIGHT    


 


Prothrombin Time  17.9 H  12.2-14.7  SEC


 


INR Comment  1.5 H  0.8-1.4  


 


Sodium Level  134 L  135-145  MMOL/L


 


Potassium Level  3.6   3.6-5.0  MMOL/L


 


Chloride Level  99     MMOL/L


 


Carbon Dioxide Level  21   21-32  MMOL/L


 


Anion Gap  14   5-14  MMOL/L


 


Blood Urea Nitrogen  18   7-18  MG/DL


 


Creatinine


 


 0.90 


 


 0.60-1.30


MG/DL


 


Estimat Glomerular Filtration


Rate 


 > 60 


 


  





 


BUN/Creatinine Ratio  20    


 


Glucose Level  158 H    MG/DL


 


Lactic Acid Level


 


 3.23 *H


 


 0.50-2.00


MMOL/L


 


Calcium Level  9.0   8.5-10.1  MG/DL


 


Corrected Calcium  9.6   8.5-10.1  MG/DL


 


Total Bilirubin  1.8 H  0.1-1.0  MG/DL


 


Aspartate Amino Transf


(AST/SGOT) 


 112 H


 


 5-34  U/L





 


Alanine Aminotransferase


(ALT/SGPT) 


 66 H


 


 0-55  U/L





 


Alkaline Phosphatase  129     U/L


 


Total Protein  7.1   6.4-8.2  GM/DL


 


Albumin  3.2   3.2-4.5  GM/DL








My Orders





Orders - MUNA CASILLAS


Cbc With Automated Diff (9/15/18 13:43)


Comprehensive Metabolic Panel (9/15/18 13:43)


Protime With Inr (9/15/18 13:43)


Ua Culture If Indicated (9/15/18 13:43)


Iv Heplock-Insert (Order) (9/15/18 13:43)


Chest 1 View, Ap/Pa Only (9/15/18 13:43)


Ct Head/Cervical Spine Wo (9/15/18 13:43)


Ct Abdomen/Pelvis W (9/15/18 13:43)


Blood Culture (9/15/18 13:43)


Lactic Acid Analyzer (9/15/18 13:43)


Acetaminophen  Tablet (Tylenol  Tablet) (9/15/18 13:45)


Iohexol Injection (Omnipaque 350 Mg/Ml 1 (9/15/18 14:00)


Sodium Chloride Flush (Catheter Flush Sy (9/15/18 14:00)


Pharmacy Communication (Pharmacy Communi (9/15/18 13:53)


Ns (Ivpb) (Sodium Chloride 0.9%) (9/15/18 14:00)


Manual Differential (9/15/18 13:48)


Ns Iv 1000 Ml (Sodium Chloride 0.9%) (9/15/18 14:30)


Ceftriaxone  For Iv Use (Rocephin  For I (9/15/18 15:45)


Lidocaine 1% Inj 20 Ml (Xylocaine 1% Inj (9/15/18 15:49)


Pharmacy Consult/Message (9/15/18 16:17)


Fentanyl  Injection (Sublimaze Injection (9/15/18 16:30)


Cefotaxime Injection (Non-Form (Claforan (9/15/18 16:30)


Body Fluid Cell Count (9/15/18 16:22)


Body Fluid Culture (9/15/18 16:22)


Glucose,Body Fluid (9/15/18 16:22)


Albumin,Body Fluid (9/15/18 16:22)


Ldh,Body Fluid (9/15/18 16:22)


Total Protein,Body Fluid (9/15/18 16:22)





Medications Given in ED





Current Medications








 Medications  Dose


 Ordered  Sig/Raghu


 Route  Start Time


 Stop Time Status Last Admin


Dose Admin


 


 Acetaminophen  1,000 mg  ONCE  ONCE


 PO  9/15/18 13:45


 9/15/18 13:46 DC 9/15/18 15:22


1,000 MG


 


 Iohexol  100 ml  ONCE  ONCE


 IV  9/15/18 14:00


 9/15/18 14:01 DC 9/15/18 15:08


100 ML


 


 Sodium Chloride  10 ml  AS NEEDED  PRN


 IV  9/15/18 14:00


    9/15/18 15:08


10 ML


 


 Sodium Chloride  250 ml  ONCE  ONCE


 IV  9/15/18 14:00


 9/15/18 14:01 DC 9/15/18 15:08


80 ML








Vital Signs/I&O











 9/15/18





 13:33


 


Temp 101.1


 


Pulse 93


 


Resp 20


 


B/P (MAP) 145/77 (99)


 


Pulse Ox 94


 


O2 Delivery Room Air





Capillary Refill :





Diagnostic Imaging





   Diagonstic Imaging:  CT


Comments


NAME:   KIT HARGROVE


[facility] REC#:   [id number]


ACCOUNT#:   [id number]


PT STATUS:   REG ER


:   1960


PHYSICIAN:   MUNA CASILLAS


ADMIT DATE:   09/15/18/ER


 ***Draft***


Date of Exam:09/15/18





CT HEAD/CERVICAL SPINE WO








PROCEDURE: CT head and CT cervical spine without contrast.





TECHNIQUE: Multiple contiguous axial images were obtained through


the brain and cervical spine without the use of intravenous


contrast. Sagittal and coronal reformations through the cervical


spine were then performed.





INDICATION: Fall. Head injury. 





COMPARISON: None.





FINDINGS: Examination is limited by motion artifact.





CT head: No CT evidence for territorial infarction. No


intracranial hemorrhage, mass effect, hydrocephalus or


extra-axial fluid collection. Intracranial vascular


calcifications. Osseous structures are intact. The paranasal


sinuses and mastoids are clear.





CT cervical spine: There are postoperative findings of an


anterior fusion with interbody bone grafting at C4-C6. Normal


alignment. Vertebral body heights are preserved. No fractures.


Advanced degenerative endplate changes at C3-C4 and C6-C7.


Osteophytic ridging appears to result in at least mild, possibly


moderate spinal canal narrowing at C5-C6. There is diffuse


moderate neural foraminal narrowing. Advanced atherosclerotic


calcifications in the carotid bifurcations, left greater than


right. The lung apices are clear.





IMPRESSION: 


1. Examination is limited by motion artifact.


2. Given the limitations, no acute intracranial or cervical spine


CT findings. Chronic findings as above.





  Dictated on workstation # [id number]








Dict:   09/15/18 1513


Trans:   09/15/18 1522


[facility] [id number]





Interpreted by:     JACINTO VALENTE MD


Electronically signed by:  


NAME:   KIT HARGROVE


[facility] REC#:   [id number]


ACCOUNT#:   [id number]


PT STATUS:   REG ER


:   1960


PHYSICIAN:   MUNA CASILLAS


ADMIT DATE:   09/15/18/ER


 ***Draft***


Date of Exam:09/15/18





CHEST 1 VIEW, AP/PA ONLY








EXAM: Chest 1 view, AP/PA only.





INDICATION: Fall.





COMPARISON: Chest radiograph, 2018.





FINDINGS: Low lung volumes. Mild atelectasis or infiltrate in the


right lung base. Normal heart size and pulmonary vascularity. No


pleural effusion or pneumothorax. Postoperative changes in the


lower cervical spine. No acute osseous findings.





IMPRESSION: 


1. Low lung volumes with mild atelectasis or infiltrate in the


right lung base.


2. Remainder unremarkable. 





  Dictated on workstation # [id number]








Dict:   09/15/18 1527


Trans:   09/15/18 1529


[facility] [id number]





Interpreted by:     JACINTO VALENTE MD


Electronically signed by:





Departure


Communication (Admissions)


Time/Spoke to Admitting Phy:  16:19


Spoke with Dr. Phelps. He'll be done to do a paracentesis for cultures. I spoke 

with Dr. Clarke. She agrees to admit. I initially did not believe that we had 

cefotaxime available here, Dr. Clarke suggestion was for cefepime. I called 

pharmacy and they verify that we do in fact have cefotaxime so they will get 

that together for a first dose. Dr. Phelps was able to drain thousand 860 mL 

clear yellow ascitic fluid in the right periumbilical region. Patient tolerated 

well.





Impression





 Primary Impression:  


 Severe sepsis


 Additional Impression:  


 Spontaneous bacterial peritonitis


Disposition:   ADMITTED AS INPATIENT


Condition:  Stable





Admissions


Decision to Admit Reason:  Admit from ER (General)


Decision to Admit/Date:  Sep 15, 2018


Time/Decision to Admit Time:  15:36





Departure-Patient Inst.


Referrals:  


NO,LOCAL PHYSICIAN (PCP/Family)


Primary Care Physician











MUNA CASILLAS Sep 15, 2018 13:49

## 2018-09-15 NOTE — DIAGNOSTIC IMAGING REPORT
PROCEDURE: CT abdomen and pelvis with contrast.



TECHNIQUE: Multiple contiguous axial images were obtained through

the abdomen and pelvis after administration of intravenous

contrast. 



INDICATION: Abdominal pain.



COMPARISON: None available. 



FINDINGS:



Lower chest: The lung bases are clear. No pericardial or pleural

effusion. 



Peritoneum: There is a moderate amount of free fluid within the

abdomen. No free intraperitoneal air. 



Liver and biliary system: The liver is small and has a somewhat

nodular morphology raising the possibility of cirrhosis. No focal

hepatic lesion. The gallbladder is normal. No biliary duct

dilation. 



Spleen and Pancreas: Borderline splenomegaly with the spleen

measuring 13.5 cm. The pancreas enhances normally without mass

lesion or peripancreatic inflammatory changes. 



Adrenals: Normal.



 tract: The kidneys enhance normally without suspicious mass or

obstruction. Urinary bladder is decompressed.  Uterus is normal

in appearance. No adnexal mass.



GI tract: The stomach has a markedly abnormal appearance with

diffuse low-attenuation wall thickening throughout its entirety.

There is an area of potential gas within the wall of the distal

stomach/duodenal bulb raising the possibility of ulcer. No

extraluminal gas to indicate perforation. No bowel obstruction. 

No pericolonic inflammatory changes.  Appendix is not seen with

certainty although there are no inflammatory changes that would

suggest acute appendicitis.



Vasculature and Lymph nodes: Normal caliber aorta. No abdominal

or pelvic lymphadenopathy.



Musculoskeletal: No concerning osseous lesion. 



IMPRESSION: 

1. Markedly abnormal stomach with extensive low-attenuation wall

thickening suggestive of gastritis. An infiltrative neoplastic

process could also give this appearance. Questionable focal

intramural gas at the level of the distal stomach/duodenal bulb

could relate to an ulcer. There are no features of ulcer

perforation. GI consultation is suggested for further management

and evaluation.

2. Cirrhosis with a moderate amount of abdominal and pelvic

ascites.

3. Borderline splenomegaly suggests portal hypertension.



Dictated by: 



  Dictated on workstation # [id number] Patient : Dwayne Lombardo Age: 27 year old Sex: female   MRN: [id number] Encounter Date: 8/14/2017      History     Chief Complaint   Patient presents with   • Wrist Pain     HPI  Dwayne Lombardo is a 27 year old female who presents to the ED with complaints of left wrist pain. Pain started about one week ago.   no known injury. She feels a grinding pian. no numbness/tingling, weakness, color changes. Does endorse some swelling at time. Pain is over radial side of distal forearm and worse with thumb movement.       Allergies   Allergen Reactions   • Haldol ANAPHYLAXIS       Discharge Medication List as of 8/14/2017  2:42 PM      CONTINUE these medications which have NOT CHANGED    Details   nalTREXone (VIVITROL) 380 MG injection Inject 380 mg into the muscle once. Indications: Opioid Dependence, started March 2nd 2017Historical Med             Discharge Medication List as of 8/14/2017  2:42 PM      START taking these medications    Details   naproxen sodium (ANAPROX) 275 MG tablet Take 1 tablet by mouth 2 times daily (with meals).Normal, Disp-20 tablet, R-0             Past Medical History:   Diagnosis Date   • Anxiety    • History of drug abuse     (Cocaine in teens) then heroin 2016: in remission   • Mental health disorder     'Dissasscoiative disorder       Past Surgical History:   Procedure Laterality Date   • CARPAL TUNNEL RELEASE Bilateral    • MIDDLE EAR SURGERY     • TONSILLECTOMY         Family History   Problem Relation Age of Onset   • Cancer Mother      ?cervical       Social History   Substance Use Topics   • Smoking status: Current Every Day Smoker     Packs/day: 0.50     Years: 10.00     Types: Cigarettes   • Smokeless tobacco: Never Used   • Alcohol use No       Review of Systems  Review of Systems   In addition to what is stated in HPI:   Constitutional: (-) fever  HENT: (-)headache   Eyes: (-) visual disturbance.   Respiratory:  (-)shortness of breath.    Cardiovascular: (-)chest pain   Gastrointestinal:  (-)nausea (-) vomiting   Skin:(-) for rash   Hematological: (-) adenopathy.    Physical Exam     ED Triage Vitals [08/14/17 1311]   ED Triage Vitals Group      Temp 98.3 °F (36.8 °C)      Pulse 83      Resp 18      /66      SpO2 98 %      EtCO2 mmHg       Height 5' 2.25\" (1.581 m)      Weight       Weight Scale Used        Physical Exam  Nursing notes reveiwed  Gen:   AAO in NAD, resting comfortably in bed. Answers questions appropriately and follows commands appropriately. Appears well nourished and stated age  Head:  Normocephalic, atraumatic  Neck: ROM intact  Resp:  Normal rate no distress  Cardio: left radial pulse 2+  Ext:  No clubbing, cyanosis, or significant edema.  No erythema  MS: left distal radial forearm/tendon tender to palpation. + finkelstein test. normal ROM of left thumb/fingers and wrist and is painful with active movement. no swelling. no bruising. no deformity. Brisk capillary refill <3 seconds in digits.  No noted instability  Left fingers, elbow and shoulder with painles ROM, non tender, no swelling, bruising or abnormality.  Skin:  Well perfused, no significant rashes. No jaundice  Neuro:  No focal Neuro deficits sensation intact to light touch and symmetric to other limb .face symmetric. Conjugate gaze. Speech clear.   Lymph:No significant Lymphadenopathy  Psych: Alert and interactive      ED Course     Procedures    Lab Results     No results found for this visit on 08/14/17.    EKG Results     EKG Interpretation  Rate:   Rhythm:    Abnormality:     EKG interpreted by ED physician    Radiology Results     Imaging Results          XR Wrist 3+ View Left (Final result)  Result time 08/14/17 13:53:44    Final result                 Impression:    IMPRESSION:  1. No acute fracture, dislocation or destructive osseous abnormality.               Narrative:      XR WRIST 3+ VW LEFT    CLINICAL INDICATION: sprain or strain    COMPARISON: none.    FINDINGS:   There is no acute fracture or  dislocation. No destructive osseous  abnormality is identified. The carpal alignment is within normal limits.  The visualized bones of the hand are intact.    MRI examination could be performed for further evaluation of soft tissue  pathology.                                ED Medication Orders     None          MDM  Radiology images were reviewed by myself. Official radiology read as above.  Patient presents with non traumatic wrist pain. Pain is over radial side of wrist with a +finkelsteins. This is c/w a dequervains tenosynovitis. Doubt septic joint, or gout at this point. Patient placed in velcro thumb spica by myself. And f/u with PCP in 1 week if not improved, ortho prn. Patient comfortable with plan.     Clinical Impression     ED Diagnosis   1. De Quervain's tenosynovitis, left         Disposition        Discharge 8/14/2017  2:40 PM  Dwayne Lombardo discharge to home/self care.      Clinical Impression:  1. De Quervain's tenosynovitis, left        Follow Up:  Patricio Tinajero DO  [address] [facility] [address]  [phone]    In 1 week  As needed     Patient was instructed if anything changes or worsens patient is to return or follow up immediately.      Discharge Medication List as of 8/14/2017  2:42 PM      START taking these medications    Details   naproxen sodium (ANAPROX) 275 MG tablet Take 1 tablet by mouth 2 times daily (with meals).Normal, Disp-20 tablet, R-0           If medications were recommended during this visit side effects and safe use of medication discussed.     Diagnosis, treatment, results and disposition was discussed with patient. Patient  Instructed that diagnosis and treatment may change as symptoms/disease progresses or changes. This is why return or follow up important. Signs and symptoms that warrant immediate return or re-evaluation discussed.  Patient is comfortable with plan and will follow up as recommended. All questions answered.     Discharge Instructions:  Rest, Ice,  Elevate  No ice directly on skin, not longer than 30 minutes, and never sleep with ice.  Tylenol or Anaprox as needed for discomfort.  Splint for one week. With daily gentle range of motion to prevent stiffness.   Follow up with PCP in one week if symptoms continue. Sooner if worsening or changing.                 Frances Martinez PA-C  08/16/17 2646

## 2018-09-16 VITALS — DIASTOLIC BLOOD PRESSURE: 84 MMHG | SYSTOLIC BLOOD PRESSURE: 172 MMHG

## 2018-09-16 VITALS — DIASTOLIC BLOOD PRESSURE: 78 MMHG | SYSTOLIC BLOOD PRESSURE: 148 MMHG

## 2018-09-16 VITALS — SYSTOLIC BLOOD PRESSURE: 136 MMHG | DIASTOLIC BLOOD PRESSURE: 71 MMHG

## 2018-09-16 VITALS — DIASTOLIC BLOOD PRESSURE: 79 MMHG | SYSTOLIC BLOOD PRESSURE: 163 MMHG

## 2018-09-16 VITALS — DIASTOLIC BLOOD PRESSURE: 73 MMHG | SYSTOLIC BLOOD PRESSURE: 140 MMHG

## 2018-09-16 VITALS — SYSTOLIC BLOOD PRESSURE: 134 MMHG | DIASTOLIC BLOOD PRESSURE: 70 MMHG

## 2018-09-16 LAB
ALBUMIN SERPL-MCNC: 2.9 GM/DL (ref 3.2–4.5)
ALP SERPL-CCNC: 124 U/L (ref 40–136)
ALT SERPL-CCNC: 62 U/L (ref 0–55)
BASOPHILS # BLD AUTO: 0 10^3/UL (ref 0–0.1)
BASOPHILS NFR BLD AUTO: 0 % (ref 0–10)
BILIRUB SERPL-MCNC: 1.6 MG/DL (ref 0.1–1)
BUN/CREAT SERPL: 23
CALCIUM SERPL-MCNC: 8.5 MG/DL (ref 8.5–10.1)
CHLORIDE SERPL-SCNC: 103 MMOL/L (ref 98–107)
CO2 SERPL-SCNC: 19 MMOL/L (ref 21–32)
CREAT SERPL-MCNC: 0.84 MG/DL (ref 0.6–1.3)
EOSINOPHIL # BLD AUTO: 0 10^3/UL (ref 0–0.3)
EOSINOPHIL NFR BLD AUTO: 0 % (ref 0–10)
ERYTHROCYTE [DISTWIDTH] IN BLOOD BY AUTOMATED COUNT: 20.6 % (ref 10–14.5)
GFR SERPLBLD BASED ON 1.73 SQ M-ARVRAT: > 60 ML/MIN
GLUCOSE SERPL-MCNC: 124 MG/DL (ref 70–105)
HCT VFR BLD CALC: 41 % (ref 35–52)
HGB BLD-MCNC: 13.9 G/DL (ref 11.5–16)
LYMPHOCYTES # BLD AUTO: 1.6 X 10^3 (ref 1–4)
LYMPHOCYTES NFR BLD AUTO: 8 % (ref 12–44)
MANUAL DIFFERENTIAL PERFORMED BLD QL: NO
MCH RBC QN AUTO: 29 PG (ref 25–34)
MCHC RBC AUTO-ENTMCNC: 34 G/DL (ref 32–36)
MCV RBC AUTO: 85 FL (ref 80–99)
MONOCYTES # BLD AUTO: 1 X 10^3 (ref 0–1)
MONOCYTES NFR BLD AUTO: 5 % (ref 0–12)
NEUTROPHILS # BLD AUTO: 17.7 X 10^3 (ref 1.8–7.8)
NEUTROPHILS NFR BLD AUTO: 87 % (ref 42–75)
PLATELET # BLD: 109 10^3/UL (ref 130–400)
PMV BLD AUTO: 10.5 FL (ref 7.4–10.4)
POTASSIUM SERPL-SCNC: 3.4 MMOL/L (ref 3.6–5)
PROT SERPL-MCNC: 6.5 GM/DL (ref 6.4–8.2)
RBC # BLD AUTO: 4.8 10^6/UL (ref 4.35–5.85)
SODIUM SERPL-SCNC: 135 MMOL/L (ref 135–145)
WBC # BLD AUTO: 20.3 10^3/UL (ref 4.3–11)

## 2018-09-16 RX ADMIN — SODIUM CHLORIDE SCH MLS/HR: 900 INJECTION, SOLUTION INTRAVENOUS at 12:44

## 2018-09-16 RX ADMIN — ONDANSETRON PRN MG: 2 INJECTION, SOLUTION INTRAMUSCULAR; INTRAVENOUS at 17:50

## 2018-09-16 RX ADMIN — Medication SCH ML: at 05:30

## 2018-09-16 RX ADMIN — FENTANYL CITRATE PRN MCG: 50 INJECTION, SOLUTION INTRAMUSCULAR; INTRAVENOUS at 01:05

## 2018-09-16 RX ADMIN — FENTANYL CITRATE PRN MCG: 50 INJECTION, SOLUTION INTRAMUSCULAR; INTRAVENOUS at 18:33

## 2018-09-16 RX ADMIN — SODIUM CHLORIDE SCH MLS/HR: 900 INJECTION, SOLUTION INTRAVENOUS at 18:33

## 2018-09-16 RX ADMIN — FENTANYL CITRATE PRN MCG: 50 INJECTION, SOLUTION INTRAMUSCULAR; INTRAVENOUS at 22:25

## 2018-09-16 RX ADMIN — METRONIDAZOLE SCH MLS/HR: 5 INJECTION, SOLUTION INTRAVENOUS at 22:27

## 2018-09-16 RX ADMIN — ENOXAPARIN SODIUM SCH MG: 100 INJECTION SUBCUTANEOUS at 22:25

## 2018-09-16 RX ADMIN — IBUPROFEN PRN MG: 800 TABLET, FILM COATED ORAL at 03:15

## 2018-09-16 RX ADMIN — SODIUM CHLORIDE SCH MLS/HR: 900 INJECTION INTRAVENOUS at 22:26

## 2018-09-16 RX ADMIN — MICONAZOLE NITRATE SCH GM: 20 POWDER TOPICAL at 08:47

## 2018-09-16 RX ADMIN — ONDANSETRON PRN MG: 2 INJECTION, SOLUTION INTRAMUSCULAR; INTRAVENOUS at 12:40

## 2018-09-16 RX ADMIN — PANTOPRAZOLE SODIUM SCH MG: 40 INJECTION, POWDER, FOR SOLUTION INTRAVENOUS at 22:24

## 2018-09-16 RX ADMIN — MICONAZOLE NITRATE SCH GM: 20 POWDER TOPICAL at 22:27

## 2018-09-16 RX ADMIN — SODIUM CHLORIDE SCH MLS/HR: 900 INJECTION, SOLUTION INTRAVENOUS at 03:15

## 2018-09-16 RX ADMIN — SODIUM CHLORIDE SCH MLS/HR: 900 INJECTION, SOLUTION INTRAVENOUS at 08:46

## 2018-09-16 RX ADMIN — Medication SCH ML: at 08:47

## 2018-09-16 RX ADMIN — Medication SCH ML: at 22:27

## 2018-09-16 RX ADMIN — ONDANSETRON PRN MG: 2 INJECTION, SOLUTION INTRAMUSCULAR; INTRAVENOUS at 05:19

## 2018-09-16 RX ADMIN — IBUPROFEN PRN MG: 800 TABLET, FILM COATED ORAL at 11:40

## 2018-09-16 RX ADMIN — FENTANYL CITRATE PRN MCG: 50 INJECTION, SOLUTION INTRAMUSCULAR; INTRAVENOUS at 14:07

## 2018-09-16 RX ADMIN — FENTANYL CITRATE PRN MCG: 50 INJECTION, SOLUTION INTRAMUSCULAR; INTRAVENOUS at 05:19

## 2018-09-16 RX ADMIN — ENOXAPARIN SODIUM SCH MG: 100 INJECTION SUBCUTANEOUS at 11:39

## 2018-09-16 RX ADMIN — FENTANYL CITRATE PRN MCG: 50 INJECTION, SOLUTION INTRAMUSCULAR; INTRAVENOUS at 08:47

## 2018-09-16 RX ADMIN — PANTOPRAZOLE SODIUM SCH MG: 40 INJECTION, POWDER, FOR SOLUTION INTRAVENOUS at 08:47

## 2018-09-16 NOTE — HISTORY & PHYSICAL-HOSPITALIST
History of Present Illness


HPI/Chief Complaint


This is a 58-year-old white female who is a poor historian.  She has a history 

of hepatitis be but does not have any awareness that she has cirrhosis of the 

liver.  He says she got the hepatitis B from an old boyfriend.  She was brought 

to the emergency room with complaints of increased falling and weakness over 

the last week or so.  She complains of having pain all over but that she has 

had that for years.  Her primary complaint this morning is that she's very very 

hungry and very very thirsty


Source:  patient


Exam Limitations:  clinical condition


Date Seen


18


Time Seen by Provider:  10:45


Attending Physician


Pallavi Clarke MD


PCP


No,Local Physician


Referring Physician





Date of Admission


Sep 15, 2018 at 15:33





Home Medications & Allergies


Home Medications


Reviewed patient Home Medication Reconciliation performed by pharmacy 

medication reconciliations technician and/or nursing.


Patients Allergies have been reviewed.





Allergies





Allergies


Coded Allergies


  Penicillins (Verified Allergy, Unknown, 9/15/18)


  doxycycline (Verified Allergy, Unknown, 9/15/18)


  levofloxacin (Verified Allergy, Unknown, 9/15/18)








Past Medical-Social-Family Hx


Past Med/Social Hx:  Reviewed Nursing Past Med/Soc Hx


Patient Social History


Marrital Status:  single


Employed/Student:  unemployed


Alcohol Use:  Denies Use


Recreational Drug Use:  Yes (SMOKES MARIJUANA DAILY)


Drug of Choice:  MARIJUANA


Smoking Status:  Never a Smoker


Type Used:  Cigarettes


Physical Abuse Screen:  No


Sexual Abuse:  No


Recent Foreign Travel:  No


Contact w/other who traveled:  No


Recent Hopitalizations:  No


Recent Infectious Disease Expo:  No





Seasonal Allergies


Seasonal Allergies:  No





Past Medical History


Surgeries:   Section, Orthopedic


Cardiac:  Hypertension


Gastrointestinal:  Hepatitis


Endocrine:  Diabetes, Insulin dep


Loss of Vision:  Denies


Hearing Impairment:  Denies


Psychosocial:  Anxiety, Depression


Skin/Integumentary:  Eczema





Review of Systems


Constitutional:  weakness


EENTM:  no symptoms reported


Respiratory:  dyspnea on exertion


Cardiovascular:  no symptoms reported


Gastrointestinal:  abdominal pain


Genitourinary:  frequency


Musculoskeletal:  muscle pain


Skin:  other (Lysis)


Psychiatric/Neurological:  Weakness





Physical Exam


Physical Exam


Vital Signs





Vital Signs - First Documented




















Capillary Refill : Less Than 3 SecondsLess Than 3 Seconds


Height, Weight, BMI


Height: 5'2.00"


Weight: 226lbs. 0.0oz. [id number]; 39.5 BMI


Method:Stated


General Appearance:  Chronically ill, Obese


Eyes:  Bilateral Eye Abnormal EOM


HEENT:  Other (Dry oral mucosa)


Neck:  Limited Range of Motion


Respiratory:  Chest Non Tender, Lungs Clear, No Accessory Muscle Use


Cardiovascular:  Regular Rate, Rhythm, No Gallop, Systolic Murmur


Gastrointestinal:  Normal Bowel Sounds, Soft, Tenderness


Rectal:  Deferred


Back:  Normal Inspection, No CVA Tenderness, No Vertebral Tenderness


Extremity:  Non Tender, No Pedal Edema


Neurologic/Psychiatric:  Alert, Depressed Affect, Disoriented


Skin:  Warm/Dry, Pallor





Results


Results/Procedures


Labs


Laboratory Tests


9/15/18 13:48








18 04:50








Patient resulted labs reviewed.





Assessment/Plan


Admission Diagnosis


1.  Sepsis with strep coccus pneumonia on Gram stain of the blood cultures-on 

Rocephin day number 2


2.  Cirrhosis of the liver with evidence of portal hypertension and ascites-

peritoneal fluid cultures pending we'll check an ammonia level


3.  History of hepatitis B- will check for hepatitis C as well


4.  Thickened gastric mucosa of required a EGD-Dr. Phelps to do early this next 

week-


5.  Thrombocytopenia most likely secondary to sequestration because of the 

splenomegaly


6.  Either mildly confused or low functioning state we'll check a serum ammonia 

level


Admission Status:  Inpatient Order (span 2 midnights)


Reason for Inpatient Admission:  


Patient with multiple comorbidities and septicemia by blood cultures





Clinical Quality Measures


DVT/VTE Risk/Contraindication:


Risk Factor Score Per Nursin


RFS Level Per Nursing on Admit:  4+=Very High











PALLAVI CLAKRE MD Sep 16, 2018 11:14

## 2018-09-17 VITALS — SYSTOLIC BLOOD PRESSURE: 140 MMHG | DIASTOLIC BLOOD PRESSURE: 79 MMHG

## 2018-09-17 VITALS — DIASTOLIC BLOOD PRESSURE: 78 MMHG | SYSTOLIC BLOOD PRESSURE: 139 MMHG

## 2018-09-17 VITALS — SYSTOLIC BLOOD PRESSURE: 147 MMHG | DIASTOLIC BLOOD PRESSURE: 74 MMHG

## 2018-09-17 VITALS — DIASTOLIC BLOOD PRESSURE: 83 MMHG | SYSTOLIC BLOOD PRESSURE: 159 MMHG

## 2018-09-17 VITALS — DIASTOLIC BLOOD PRESSURE: 81 MMHG | SYSTOLIC BLOOD PRESSURE: 179 MMHG

## 2018-09-17 VITALS — DIASTOLIC BLOOD PRESSURE: 68 MMHG | SYSTOLIC BLOOD PRESSURE: 152 MMHG

## 2018-09-17 LAB
ALBUMIN SERPL-MCNC: 3.1 GM/DL (ref 3.2–4.5)
ALP SERPL-CCNC: 137 U/L (ref 40–136)
ALT SERPL-CCNC: 63 U/L (ref 0–55)
BASOPHILS # BLD AUTO: 0 10^3/UL (ref 0–0.1)
BASOPHILS NFR BLD AUTO: 0 % (ref 0–10)
BILIRUB SERPL-MCNC: 1.2 MG/DL (ref 0.1–1)
BUN/CREAT SERPL: 24
CALCIUM SERPL-MCNC: 8.9 MG/DL (ref 8.5–10.1)
CHLORIDE SERPL-SCNC: 106 MMOL/L (ref 98–107)
CO2 SERPL-SCNC: 19 MMOL/L (ref 21–32)
CREAT SERPL-MCNC: 1.15 MG/DL (ref 0.6–1.3)
EOSINOPHIL # BLD AUTO: 0 10^3/UL (ref 0–0.3)
EOSINOPHIL NFR BLD AUTO: 0 % (ref 0–10)
ERYTHROCYTE [DISTWIDTH] IN BLOOD BY AUTOMATED COUNT: 20.7 % (ref 10–14.5)
GFR SERPLBLD BASED ON 1.73 SQ M-ARVRAT: 48 ML/MIN
GLUCOSE SERPL-MCNC: 99 MG/DL (ref 70–105)
HCT VFR BLD CALC: 38 % (ref 35–52)
HGB BLD-MCNC: 12.3 G/DL (ref 11.5–16)
LYMPHOCYTES # BLD AUTO: 2 X 10^3 (ref 1–4)
LYMPHOCYTES NFR BLD AUTO: 13 % (ref 12–44)
MANUAL DIFFERENTIAL PERFORMED BLD QL: NO
MCH RBC QN AUTO: 28 PG (ref 25–34)
MCHC RBC AUTO-ENTMCNC: 33 G/DL (ref 32–36)
MCV RBC AUTO: 86 FL (ref 80–99)
MONOCYTES # BLD AUTO: 1.3 X 10^3 (ref 0–1)
MONOCYTES NFR BLD AUTO: 8 % (ref 0–12)
NEUTROPHILS # BLD AUTO: 11.7 X 10^3 (ref 1.8–7.8)
NEUTROPHILS NFR BLD AUTO: 78 % (ref 42–75)
PLATELET # BLD: 165 10^3/UL (ref 130–400)
PMV BLD AUTO: 10.8 FL (ref 7.4–10.4)
POTASSIUM SERPL-SCNC: 3.5 MMOL/L (ref 3.6–5)
PROT SERPL-MCNC: 6.8 GM/DL (ref 6.4–8.2)
RBC # BLD AUTO: 4.39 10^6/UL (ref 4.35–5.85)
SODIUM SERPL-SCNC: 138 MMOL/L (ref 135–145)
WBC # BLD AUTO: 15 10^3/UL (ref 4.3–11)

## 2018-09-17 PROCEDURE — 0DB78ZX EXCISION OF STOMACH, PYLORUS, VIA NATURAL OR ARTIFICIAL OPENING ENDOSCOPIC, DIAGNOSTIC: ICD-10-PCS | Performed by: SURGERY

## 2018-09-17 RX ADMIN — SODIUM CHLORIDE SCH MLS/HR: 900 INJECTION, SOLUTION INTRAVENOUS at 05:29

## 2018-09-17 RX ADMIN — MICONAZOLE NITRATE SCH GM: 20 POWDER TOPICAL at 07:55

## 2018-09-17 RX ADMIN — ONDANSETRON PRN MG: 2 INJECTION, SOLUTION INTRAMUSCULAR; INTRAVENOUS at 11:35

## 2018-09-17 RX ADMIN — Medication SCH ML: at 05:17

## 2018-09-17 RX ADMIN — MICONAZOLE NITRATE SCH APPLIC: 20 POWDER TOPICAL at 21:06

## 2018-09-17 RX ADMIN — SODIUM CHLORIDE SCH MLS/HR: 900 INJECTION INTRAVENOUS at 21:03

## 2018-09-17 RX ADMIN — METRONIDAZOLE SCH MLS/HR: 5 INJECTION, SOLUTION INTRAVENOUS at 13:37

## 2018-09-17 RX ADMIN — PANTOPRAZOLE SODIUM SCH MG: 40 INJECTION, POWDER, FOR SOLUTION INTRAVENOUS at 21:05

## 2018-09-17 RX ADMIN — FENTANYL CITRATE PRN MCG: 50 INJECTION, SOLUTION INTRAMUSCULAR; INTRAVENOUS at 04:32

## 2018-09-17 RX ADMIN — ENOXAPARIN SODIUM SCH MG: 100 INJECTION SUBCUTANEOUS at 21:05

## 2018-09-17 RX ADMIN — FENTANYL CITRATE PRN MCG: 50 INJECTION, SOLUTION INTRAMUSCULAR; INTRAVENOUS at 11:35

## 2018-09-17 RX ADMIN — PANTOPRAZOLE SODIUM SCH MG: 40 INJECTION, POWDER, FOR SOLUTION INTRAVENOUS at 07:55

## 2018-09-17 RX ADMIN — Medication SCH ML: at 07:55

## 2018-09-17 RX ADMIN — SODIUM CHLORIDE SCH MLS/HR: 900 INJECTION, SOLUTION INTRAVENOUS at 18:18

## 2018-09-17 RX ADMIN — ENOXAPARIN SODIUM SCH MG: 100 INJECTION SUBCUTANEOUS at 07:55

## 2018-09-17 RX ADMIN — SUCRALFATE SCH GM: 1 TABLET ORAL at 21:05

## 2018-09-17 RX ADMIN — SUCRALFATE SCH GM: 1 TABLET ORAL at 18:08

## 2018-09-17 RX ADMIN — METRONIDAZOLE SCH MLS/HR: 5 INJECTION, SOLUTION INTRAVENOUS at 05:28

## 2018-09-17 RX ADMIN — FENTANYL CITRATE PRN MCG: 50 INJECTION, SOLUTION INTRAMUSCULAR; INTRAVENOUS at 02:43

## 2018-09-17 RX ADMIN — Medication SCH ML: at 21:06

## 2018-09-17 RX ADMIN — METRONIDAZOLE SCH MLS/HR: 5 INJECTION, SOLUTION INTRAVENOUS at 21:06

## 2018-09-17 RX ADMIN — FENTANYL CITRATE PRN MCG: 50 INJECTION, SOLUTION INTRAMUSCULAR; INTRAVENOUS at 06:20

## 2018-09-17 NOTE — OPERATIVE REPORT
DATE OF SERVICE:  09/17/2018



PREOPERATIVE DIAGNOSIS:

Left upper quadrant abdominal pain.



POSTOPERATIVE DIAGNOSES:

Gastritis, antral mass/inflammation, hiatal hernia.



PROCEDURE:

EGD with biopsy of antral mass.



SURGEON:

David Phelps DO



ANESTHESIA:

Per CRNA.



ESTIMATED BLOOD LOSS:

Scant.



COMPLICATIONS:

None.



INDICATIONS:

The patient is a 58-year-old female who presented with significant abdominal

pain.  Her pain is in the left upper quadrant.  She has CT scan findings

suggestive of severe gastritis and question intramural air at the distal

stomach/duodenal area.  The patient was explained risks and benefits of

procedure and wished to proceed with procedure.  Consent was signed in the

chart.



DESCRIPTION OF PROCEDURE:

The patient was taken to the endoscopy suite, placed in left lateral recumbent

position.  Timeout was performed.  Scope was inserted in the mouth down the

esophagus, stomach and into the duodenum without difficulty.  No polyps, masses

or ulcerations visualized within the duodenum.  Scope was slowly retracted back

to the stomach, which was insufflated.  Significant erythematous changes

throughout the entire stomach.  At the antrum, a significant amount of

inflammation and appearance of a mass and some polyps present.  Biopsy of the

mass was obtained.  The scope was retroflexed noting a hiatal hernia and still

again noting the significant erythematous changes of gastritis.  Scope was

returned to its normal position, slowly withdrawn into the distal esophagus,

which had erythematous changes as well.  Scope was slowly retracted back until

completely removed, noting no other pathology except for some esophageal varices

as well.  Scope was then withdrawn until completely removed.  She tolerated the

procedure well without any complications.



RECOMMENDATIONS:

We will start her on clear liquid diet.  We will add Carafate 1 gram 4 times a

day to her already Protonix 40 mg b.i.d.  Await pathology results.





Job ID: [id number]

DocumentID: [id number]

Dictated Date:  09/17/2018 19:20:20

Transcription Date: 09/17/2018 22:41:49

Dictated By: DAVID PHELPS DO

## 2018-09-17 NOTE — PROGRESS NOTE-POST OPERATIVE
Post-Operative Progess Note


Surgeon (s)/Assistant (s)


Surgeon


DAVID ARANDA DO


Assistant:  na





Pre-Operative Diagnosis


luq abdominal pain





Post-Operative Diagnosis





gastritis, antral mass/inflammation





Procedure & Operative Findings


Date of Procedure


9/17/18


Procedure Performed/Findings


egd with biopsy antral mass


Anesthesia Type


per crna





Estimated Blood Loss


Estimated blood loss (mL):  scant





Specimens/Packing


Specimens Removed


antral mass











DAVID ARANDA DO Sep 17, 2018 19:15

## 2018-09-17 NOTE — PROGRESS NOTE-HOSPITALIST
Subjective


HPI/CC On Admission


Date Seen by Provider:  Sep 17, 2018


Time Seen by Provider:  11:23


This is a 58-year-old white female who is a poor historian.  She has a history 

of hepatitis be but does not have any awareness that she has cirrhosis of the 

liver.  He says she got the hepatitis B from an old boyfriend.  She was brought 

to the emergency room with complaints of increased falling and weakness over 

the last week or so.  She complains of having pain all over but that she has 

had that for years.  Her primary complaint this morning is that she's very very 

hungry and very very thirsty


Subjective/Events-last exam


Pt states she is having abd pain, nausea, and is hungry. She seems to be 

confused during my exam.





Focused Exam


Lactate Level


9/15/18 13:48: Lactic Acid Level 3.23*H


9/15/18 17:18: Lactic Acid Level 2.03*H








Objective


Exam


Vital Signs





Vital Signs








  Date Time  Temp Pulse Resp B/P (MAP) Pulse Ox O2 Delivery O2 Flow Rate FiO2


 


18 09:34 97.4 106 16 133/62 (85) 94 Room Air  





Capillary Refill : Less Than 3 SecondsLess Than 3 Seconds


General Appearance:  No Apparent Distress, Chronically ill


Respiratory:  Lungs Clear, No Respiratory Distress


Cardiovascular:  Regular Rate, Rhythm, No Murmur


Gastrointestinal:  Soft, Distended; No Guarding; Tenderness (mild)


Neurologic/Psychiatric:  Alert, Disoriented





Results/Procedures


Lab


Laboratory Tests


18 05:15








Patient resulted labs reviewed.





Assessment/Plan


Assessment and Plan


Assess & Plan/Chief Complaint


Sepsis





Diagnosis/Problems


Diagnosis/Problems





(1) Severe sepsis


Status:  Acute


Assessment & Plan:  Severe sepsis on arrival- now resolved


Concern for SBP


Continue Rocephin and Flagyl


fluid from paracentesis shows no growth (but done after antibiotics)


Bacteremic x1 with strep pneumo


White count improved





(2) Spontaneous bacterial peritonitis


Status:  Acute


Assessment & Plan:  Continue on abx as above


Cultures negative





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


Assessment & Plan:  Hepatitis panel pending





(4) Gastritis


Assessment & Plan:  Plan for EGD today


Continue on PPI


Qualifiers:  


   Gastritis type:  unspecified gastritis  Chronicity:  unspecified  Gastritis 

bleeding:  without bleeding  Qualified Codes:  K29.70 - Gastritis, unspecified, 

without bleeding





Clinical Quality Measures


DVT/VTE Risk/Contraindication:


Risk Factor Score Per Nursin


RFS Level Per Nursing on Admit:  4+=Very High











JOHN SLADE MD Sep 17, 2018 11:29 am

## 2018-09-17 NOTE — OPERATIVE REPORT
DATE OF SERVICE:  09/15/2018



PREOPERATIVE DIAGNOSIS:

Cirrhosis with ascites, possible spontaneous bacterial peritonitis.



POSTOPERATIVE DIAGNOSIS:

Cirrhosis with ascites, possible spontaneous bacterial peritonitis.



PROCEDURE:

Ultrasound-guided paracentesis.



SURGEON:

David Phelps DO



ANESTHESIA:

5 mL of 1% lidocaine.



ESTIMATED BLOOD LOSS:

Minimal.



COMPLICATIONS:

None.



INDICATIONS:

The patient is a 58-year-old female with cirrhosis and ascites.  Concern for

SBP.  I was asked to do paracentesis for culturing of fluid.  She understands

risks and benefits and wished to proceed with procedure.  Consent was signed on

the chart.



DESCRIPTION OF PROCEDURE:

The patient was prepped and draped in a sterile fashion.  The ultrasound was

used by myself to find the pocket that was the largest for access.  Local

anesthetic was used to infiltrate the subcutaneous tissue.  An 11 blade scalpel

was used to make a stab incision which the safety paracentesis needle and

catheter were then inserted into this fluid pocket; and once straw-colored fluid

was withdrawn, the catheter was then advanced.  Fluid was obtained for culture

and cell counts.  A total of 1860 mL were drawn from her abdomen.  The catheter

was then removed and a sterile bandage was applied.  The patient tolerated

procedure well without any complications.





Job ID: [id number]

DocumentID: [id number]

Dictated Date:  09/17/2018 11:28:55

Transcription Date: 09/17/2018 14:14:56

Dictated By: DAVID PHELPS DO

## 2018-09-18 VITALS — SYSTOLIC BLOOD PRESSURE: 144 MMHG | DIASTOLIC BLOOD PRESSURE: 87 MMHG

## 2018-09-18 VITALS — SYSTOLIC BLOOD PRESSURE: 133 MMHG | DIASTOLIC BLOOD PRESSURE: 62 MMHG

## 2018-09-18 VITALS — DIASTOLIC BLOOD PRESSURE: 80 MMHG | SYSTOLIC BLOOD PRESSURE: 146 MMHG

## 2018-09-18 VITALS — DIASTOLIC BLOOD PRESSURE: 86 MMHG | SYSTOLIC BLOOD PRESSURE: 134 MMHG

## 2018-09-18 VITALS — DIASTOLIC BLOOD PRESSURE: 84 MMHG | SYSTOLIC BLOOD PRESSURE: 136 MMHG

## 2018-09-18 VITALS — DIASTOLIC BLOOD PRESSURE: 83 MMHG | SYSTOLIC BLOOD PRESSURE: 142 MMHG

## 2018-09-18 LAB
ALBUMIN SERPL-MCNC: 2.9 GM/DL (ref 3.2–4.5)
ALP SERPL-CCNC: 111 U/L (ref 40–136)
ALT SERPL-CCNC: 58 U/L (ref 0–55)
AMMONIA PLAS-SCNC: 61 UMOL/L (ref 11–32)
BASOPHILS # BLD AUTO: 0 10^3/UL (ref 0–0.1)
BASOPHILS NFR BLD AUTO: 0 % (ref 0–10)
BILIRUB SERPL-MCNC: 1.1 MG/DL (ref 0.1–1)
BUN/CREAT SERPL: 32
CALCIUM SERPL-MCNC: 8.6 MG/DL (ref 8.5–10.1)
CHLORIDE SERPL-SCNC: 105 MMOL/L (ref 98–107)
CO2 SERPL-SCNC: 16 MMOL/L (ref 21–32)
CREAT SERPL-MCNC: 0.93 MG/DL (ref 0.6–1.3)
EOSINOPHIL # BLD AUTO: 0 10^3/UL (ref 0–0.3)
EOSINOPHIL NFR BLD AUTO: 0 % (ref 0–10)
ERYTHROCYTE [DISTWIDTH] IN BLOOD BY AUTOMATED COUNT: 20.4 % (ref 10–14.5)
GFR SERPLBLD BASED ON 1.73 SQ M-ARVRAT: > 60 ML/MIN
GLUCOSE SERPL-MCNC: 88 MG/DL (ref 70–105)
HCT VFR BLD CALC: 35 % (ref 35–52)
HGB BLD-MCNC: 12.2 G/DL (ref 11.5–16)
LYMPHOCYTES # BLD AUTO: 2 X 10^3 (ref 1–4)
LYMPHOCYTES NFR BLD AUTO: 16 % (ref 12–44)
MANUAL DIFFERENTIAL PERFORMED BLD QL: NO
MCH RBC QN AUTO: 29 PG (ref 25–34)
MCHC RBC AUTO-ENTMCNC: 35 G/DL (ref 32–36)
MCV RBC AUTO: 85 FL (ref 80–99)
MONOCYTES # BLD AUTO: 0.9 X 10^3 (ref 0–1)
MONOCYTES NFR BLD AUTO: 7 % (ref 0–12)
NEUTROPHILS # BLD AUTO: 9.6 X 10^3 (ref 1.8–7.8)
NEUTROPHILS NFR BLD AUTO: 77 % (ref 42–75)
PLATELET # BLD: 155 10^3/UL (ref 130–400)
PMV BLD AUTO: 10.4 FL (ref 7.4–10.4)
POTASSIUM SERPL-SCNC: 3.7 MMOL/L (ref 3.6–5)
PROT SERPL-MCNC: 6.6 GM/DL (ref 6.4–8.2)
RBC # BLD AUTO: 4.18 10^6/UL (ref 4.35–5.85)
SODIUM SERPL-SCNC: 136 MMOL/L (ref 135–145)
WBC # BLD AUTO: 12.4 10^3/UL (ref 4.3–11)

## 2018-09-18 RX ADMIN — SUCRALFATE SCH GM: 1 TABLET ORAL at 21:14

## 2018-09-18 RX ADMIN — IBUPROFEN PRN MG: 800 TABLET, FILM COATED ORAL at 00:08

## 2018-09-18 RX ADMIN — SODIUM CHLORIDE SCH MLS/HR: 900 INJECTION, SOLUTION INTRAVENOUS at 07:54

## 2018-09-18 RX ADMIN — SODIUM CHLORIDE SCH MLS/HR: 900 INJECTION, SOLUTION INTRAVENOUS at 14:49

## 2018-09-18 RX ADMIN — METRONIDAZOLE SCH MLS/HR: 5 INJECTION, SOLUTION INTRAVENOUS at 14:58

## 2018-09-18 RX ADMIN — Medication SCH ML: at 05:28

## 2018-09-18 RX ADMIN — ENOXAPARIN SODIUM SCH MG: 100 INJECTION SUBCUTANEOUS at 08:22

## 2018-09-18 RX ADMIN — MICONAZOLE NITRATE SCH APPLIC: 20 POWDER TOPICAL at 08:21

## 2018-09-18 RX ADMIN — SUCRALFATE SCH GM: 1 TABLET ORAL at 10:36

## 2018-09-18 RX ADMIN — SUCRALFATE SCH GM: 1 TABLET ORAL at 06:03

## 2018-09-18 RX ADMIN — METRONIDAZOLE SCH MLS/HR: 5 INJECTION, SOLUTION INTRAVENOUS at 06:03

## 2018-09-18 RX ADMIN — METRONIDAZOLE SCH MLS/HR: 5 INJECTION, SOLUTION INTRAVENOUS at 21:14

## 2018-09-18 RX ADMIN — SUCRALFATE SCH GM: 1 TABLET ORAL at 14:58

## 2018-09-18 RX ADMIN — MICONAZOLE NITRATE SCH APPLIC: 20 POWDER TOPICAL at 21:16

## 2018-09-18 RX ADMIN — FENTANYL CITRATE PRN MCG: 50 INJECTION, SOLUTION INTRAMUSCULAR; INTRAVENOUS at 22:13

## 2018-09-18 RX ADMIN — SODIUM CHLORIDE SCH MLS/HR: 900 INJECTION INTRAVENOUS at 21:15

## 2018-09-18 RX ADMIN — Medication SCH ML: at 21:15

## 2018-09-18 RX ADMIN — ENOXAPARIN SODIUM SCH MG: 100 INJECTION SUBCUTANEOUS at 21:15

## 2018-09-18 RX ADMIN — FENTANYL CITRATE PRN MCG: 50 INJECTION, SOLUTION INTRAMUSCULAR; INTRAVENOUS at 23:06

## 2018-09-18 RX ADMIN — IBUPROFEN PRN MG: 800 TABLET, FILM COATED ORAL at 21:14

## 2018-09-18 RX ADMIN — PANTOPRAZOLE SODIUM SCH MG: 40 INJECTION, POWDER, FOR SOLUTION INTRAVENOUS at 08:21

## 2018-09-18 RX ADMIN — Medication SCH ML: at 14:49

## 2018-09-18 RX ADMIN — PANTOPRAZOLE SODIUM SCH MG: 40 INJECTION, POWDER, FOR SOLUTION INTRAVENOUS at 21:14

## 2018-09-18 RX ADMIN — FENTANYL CITRATE PRN MCG: 50 INJECTION, SOLUTION INTRAMUSCULAR; INTRAVENOUS at 21:15

## 2018-09-18 NOTE — PHYSICAL THERAPY EVALUATION
PT Evaluation-General


Medical Diagnosis


Admission Date


Sep 15, 2018 at 15:33


Medical Diagnosis:  severe sepsis/SBP


Onset Date:  Sep 15, 2018





Therapy Diagnosis


Therapy Diagnosis:  generalized weakness





Height/Weight


Height (Feet):  5


Height (Inches):  2.00


Weight (Pounds):  226


Weight (Ounces):  0.0





Precautions


Precautions/Isolations:  Standard Precautions





Weight Bear Status


Right Lower Extremity:  Right


Weight Bearing/Tolerated


Left Lower Extremity:  Left


Weight Bearing/Tolerated





Referral


Physician:  Iraida


Reason for Referral:  Evaluation/Treatment





Medical History


Pertinent Medical History:  COPD, DM, HTN, Rheumatic Heart Disease


Additional Medical History


hepatitis B


Current History


EMS secondary to multiple falls, hitting head, feels like legs are going to 

give out (does not uses FWW at home)/spontaneous peritonitis


Reviewed History:  Yes





Prior/Core FIM


Prior Level of Function


              Functional Weber Measure


0=Not Assessed/NA   4=Minimal Assistance


1=Total Assistance   5=Supervision or Setup


2=Maximal Assistance   6=Modified Weber


3=Moderate Assistance   7=Complete Weber


Bed Mobility:  5


Transfers (B,C,W/C) (FIM):  5


Gait:  1


Per patient, she is in bed for the majority of the day and will get up, 

sometimes, to use the restroom.





PT Evaluation-Current


Subjective


Patient reluctantly agrees to PT.





Pain





   Numeric Pain Scale:  0-No Pain


   Location:  No Pain Reported





Objective


Patient Orientation:  Normal For Age


Problem Solving:  Fair


Attachments:  Lynne Catheter, IV





ROM/Strength


ROM Lower Extremities


bilateral LE WNL


Strength Lower Extremities


3+/5 grossly bilaterally





Integumentary/Posture


Integumentary


refer to nursing notes


Bladder Incontinence:  Lynne Cath


Posture


WFL





Neuromuscular


(Tone, Coordination, Reflexes)


intact, however, noted bilateral LE disuse atrophy





Sensory


Vision:  Functional


Hearing:  Functional


Sensation Right Lower Extremit:  Impaired


Sensation Left Lower Extremity:  Impaired





Transfers


              Functional Weber Measure


0=Not Assessed/NA   4=Minimal Assistance


1=Total Assistance   5=Supervision or Setup


2=Maximal Assistance   6=Modified Weber


3=Moderate Assistance   7=Complete Weber


Transfers (B, C, W/C) (FIM):  5


Scootin


Rollin


Supine to/from Sit:  5


Sit to/from Stand:  5





Gait


Mode of Locomotion:  Both


Anticipated Mode of Locomotion:  Both


Gait (FIM):  1


Distance (FIM):  1=up to 49 ft


Distance:  20' x 2


Gait Level of Assist:  4


Gait Persons Needed:  1


Gait Assistive Device:  FWW


Comments/Gait Description


Patient appeared to behaviorally, stop ambulating and "throw" herself on the 

bed.  Functional gait prior to that episode.  Patient demonstrated good 

strength during situation.





Balance


Sitting Static:  Normal


Sitting Dynamic:  Normal


Standing Static:  Fair


 Standing Dynamic:  Fair





Assessment/Needs


58 y.o. inactive female, will be seen short term by skilled PT to address 

functional strength and mobility.  Patient is limited by motivation to actively 

participate with therapy, strength and mobility.


Rehab Potential:  Poor


Post Rehab Potential-Barriers:  compliance





PT Short Term Goals


Short Term Goals


Time Frame:  Sep 26, 2018


Transfers (B,C,W/C) (FIM):  5


Gait (FIM):  1


Distance (FIM):  1=up to 49 ft


Gait Distance Comment:  45'


Gait Level of Assist:  5


Gait Assistive Device:  FWW





PT Plan


Problem List


Problem List:  Activity Tolerance, Safety, Gait





Treatment/Plan


Treatment Plan:  Continue Plan of Care


Treatment Plan:  Bed Mobility, Education, Functional Activity Susan, Functional 

Strength, Gait, Safety, Therapeutic Exercise, Transfers


Treatment Duration:  Sep 26, 2018


Frequency:  6 times per week


Estimated Hrs Per Day:  .5 hour per day


Patient and/or Family Agrees t:  Yes





Discharge Recommendations


Therapy D/C Recommendations:  Home w/ Family Support, Nursing Home Placement, 

Skilled Nursing (TCU/NH)





Time/GCodes


Time In:  1300


Time Out:  1326


Total Billed Treatment Time:  26


Total Billed Treatment


1 visit


EVModC 26 min


G Codes Necessary:  RAHUL Hyde PT Sep 18, 2018 14:12

## 2018-09-18 NOTE — PROGRESS NOTE-HOSPITALIST
Subjective


HPI/CC On Admission


Date Seen by Provider:  Sep 18, 2018


Time Seen by Provider:  10:30


This is a 58-year-old white female who is a poor historian.  She has a history 

of hepatitis be but does not have any awareness that she has cirrhosis of the 

liver.  He says she got the hepatitis B from an old boyfriend.  She was brought 

to the emergency room with complaints of increased falling and weakness over 

the last week or so.  She complains of having pain all over but that she has 

had that for years.  Her primary complaint this morning is that she's very very 

hungry and very very thirsty


Subjective/Events-last exam


Pt reports feeling better and is requesting DC home. She is still unable to get 

up and go to the bathroom on her own and is grimacing in pain during my exam.





Focused Exam


Lactate Level


9/15/18 13:48: Lactic Acid Level 3.23*H


9/15/18 17:18: Lactic Acid Level 2.03*H








Objective


Exam


Vital Signs





Vital Signs








  Date Time  Temp Pulse Resp B/P (MAP) Pulse Ox O2 Delivery O2 Flow Rate FiO2


 


18 09:34 97.4 106 16 133/62 (85) 94 Room Air  





Capillary Refill : Less Than 3 SecondsLess Than 3 Seconds


General Appearance:  WD/WN, Other (appears in pain)


Respiratory:  Lungs Clear, No Respiratory Distress


Cardiovascular:  Regular Rate, Rhythm, No Murmur


Gastrointestinal:  Normal Bowel Sounds, Non Tender, Soft


Neurologic/Psychiatric:  Alert, Other (oriented to person and place only)





Results/Procedures


Lab


Laboratory Tests


18 05:15








Patient resulted labs reviewed.





Assessment/Plan


Assessment and Plan


Assess & Plan/Chief Complaint


Sepsis





Diagnosis/Problems


Diagnosis/Problems





(1) Severe sepsis


Status:  Acute


Assessment & Plan:  Severe sepsis on arrival- now resolved


Concern for SBP


Continue Rocephin and Flagyl


fluid from paracentesis shows no growth (but done after antibiotics)


Bacteremic x1 with strep pneumo- pansensitive


White count improved





(2) Spontaneous bacterial peritonitis


Status:  Acute


Assessment & Plan:  Continue on abx as above


Cultures negative





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


Assessment & Plan:  Hepatitis panel pending





(4) Gastritis


Assessment & Plan:  EGD showed gastritis and antral mass


   Biopsies taken


Continue PPI


Qualifiers:  


   Gastritis type:  unspecified gastritis  Chronicity:  unspecified  Gastritis 

bleeding:  without bleeding  Qualified Codes:  K29.70 - Gastritis, unspecified, 

without bleeding


(5) Debility


Assessment & Plan:  PT/OT consulted


Discussed with family they report she falls almost daily at home and regularly 

soils herself


   They have tried to place her in a NH but patient has resisted


Would benefit for at least SNF stay


 consulted








Clinical Quality Measures


DVT/VTE Risk/Contraindication:


Risk Factor Score Per Nursin


RFS Level Per Nursing on Admit:  4+=Very High











JOHN SLADE MD Sep 18, 2018 12:13 pm

## 2018-09-18 NOTE — OCC THERAPY PROGRESS NOTE
Therapy Progress Note


[id number]   Pt seen in room, up in bed, asleep. Unable to stay awake long 

enough to answer questions or complete evaluation. She did indicate that her 

birthday was [birth year]. Will try again tomorrow.











ABENA SPARKS OT Sep 18, 2018 15:17

## 2018-09-19 VITALS — SYSTOLIC BLOOD PRESSURE: 155 MMHG | DIASTOLIC BLOOD PRESSURE: 81 MMHG

## 2018-09-19 VITALS — SYSTOLIC BLOOD PRESSURE: 153 MMHG | DIASTOLIC BLOOD PRESSURE: 74 MMHG

## 2018-09-19 VITALS — DIASTOLIC BLOOD PRESSURE: 74 MMHG | SYSTOLIC BLOOD PRESSURE: 148 MMHG

## 2018-09-19 VITALS — SYSTOLIC BLOOD PRESSURE: 143 MMHG | DIASTOLIC BLOOD PRESSURE: 70 MMHG

## 2018-09-19 VITALS — DIASTOLIC BLOOD PRESSURE: 73 MMHG | SYSTOLIC BLOOD PRESSURE: 141 MMHG

## 2018-09-19 VITALS — DIASTOLIC BLOOD PRESSURE: 78 MMHG | SYSTOLIC BLOOD PRESSURE: 136 MMHG

## 2018-09-19 VITALS — DIASTOLIC BLOOD PRESSURE: 66 MMHG | SYSTOLIC BLOOD PRESSURE: 137 MMHG

## 2018-09-19 RX ADMIN — FENTANYL CITRATE PRN MCG: 50 INJECTION, SOLUTION INTRAMUSCULAR; INTRAVENOUS at 00:57

## 2018-09-19 RX ADMIN — METRONIDAZOLE SCH MLS/HR: 5 INJECTION, SOLUTION INTRAVENOUS at 06:02

## 2018-09-19 RX ADMIN — SUCRALFATE SCH GM: 1 TABLET ORAL at 11:16

## 2018-09-19 RX ADMIN — SUCRALFATE SCH GM: 1 TABLET ORAL at 06:02

## 2018-09-19 RX ADMIN — Medication SCH ML: at 06:02

## 2018-09-19 RX ADMIN — BUSPIRONE HYDROCHLORIDE SCH MG: 15 TABLET ORAL at 23:37

## 2018-09-19 RX ADMIN — ENOXAPARIN SODIUM SCH MG: 100 INJECTION SUBCUTANEOUS at 23:36

## 2018-09-19 RX ADMIN — ENOXAPARIN SODIUM SCH MG: 100 INJECTION SUBCUTANEOUS at 09:05

## 2018-09-19 RX ADMIN — FENTANYL CITRATE PRN MCG: 50 INJECTION, SOLUTION INTRAMUSCULAR; INTRAVENOUS at 04:19

## 2018-09-19 RX ADMIN — MICONAZOLE NITRATE SCH APPLIC: 20 POWDER TOPICAL at 10:51

## 2018-09-19 RX ADMIN — SODIUM CHLORIDE SCH MLS/HR: 900 INJECTION, SOLUTION INTRAVENOUS at 00:57

## 2018-09-19 RX ADMIN — PANTOPRAZOLE SODIUM SCH MG: 40 INJECTION, POWDER, FOR SOLUTION INTRAVENOUS at 09:05

## 2018-09-19 RX ADMIN — FENTANYL CITRATE PRN MCG: 50 INJECTION, SOLUTION INTRAMUSCULAR; INTRAVENOUS at 23:38

## 2018-09-19 RX ADMIN — SODIUM CHLORIDE SCH MLS/HR: 900 INJECTION INTRAVENOUS at 23:40

## 2018-09-19 RX ADMIN — METRONIDAZOLE SCH MLS/HR: 5 INJECTION, SOLUTION INTRAVENOUS at 15:02

## 2018-09-19 RX ADMIN — METRONIDAZOLE SCH MLS/HR: 5 INJECTION, SOLUTION INTRAVENOUS at 23:40

## 2018-09-19 RX ADMIN — SUCRALFATE SCH GM: 1 TABLET ORAL at 23:37

## 2018-09-19 RX ADMIN — PANTOPRAZOLE SODIUM SCH MG: 40 INJECTION, POWDER, FOR SOLUTION INTRAVENOUS at 23:36

## 2018-09-19 RX ADMIN — SUCRALFATE SCH GM: 1 TABLET ORAL at 15:02

## 2018-09-19 RX ADMIN — ONDANSETRON PRN MG: 2 INJECTION, SOLUTION INTRAMUSCULAR; INTRAVENOUS at 08:28

## 2018-09-19 RX ADMIN — Medication SCH ML: at 23:37

## 2018-09-19 RX ADMIN — FENTANYL CITRATE PRN MCG: 50 INJECTION, SOLUTION INTRAMUSCULAR; INTRAVENOUS at 03:04

## 2018-09-19 RX ADMIN — FENTANYL CITRATE PRN MCG: 50 INJECTION, SOLUTION INTRAMUSCULAR; INTRAVENOUS at 02:10

## 2018-09-19 RX ADMIN — SODIUM CHLORIDE SCH MLS/HR: 900 INJECTION, SOLUTION INTRAVENOUS at 06:02

## 2018-09-19 RX ADMIN — MICONAZOLE NITRATE SCH APPLIC: 20 POWDER TOPICAL at 23:39

## 2018-09-19 RX ADMIN — Medication SCH ML: at 15:10

## 2018-09-19 NOTE — PHYSICAL THERAPY PROGRESS NOTE
Therapy Progress Note


Pt sitting in recliner upon arrival.  Pt reports generalized high level pain 

all over but doesn't rate.  PTA reports to Nurse the pt's request for pain med.

  PT is noncompliant and will not assist with sitting up to put gait belt on, 

continues to pretend asleep.  PTA advises pt of need & benefits of PT but pt 

does not move.  PTA advises pt leaving w/o tx.





1, no tx rendered











RUBA FISCHER PTA Sep 19, 2018 10:54

## 2018-09-19 NOTE — OCC THERAPY PROGRESS NOTE
Therapy Progress Note


Attempted OT treatment at 1130. Pt sitting in chair, refused to participate in 

therapy at this time states "I just don't feel good." Education provided 

regarding benefits of therapy. Pt continues to refuse. Will attempt this 

afternoon. Pt sitting in chair with needs met.


1, visit











ILDA PIERRE OT Sep 19, 2018 11:46

## 2018-09-19 NOTE — PROGRESS NOTE-HOSPITALIST
Subjective


HPI/CC On Admission


Date Seen by Provider:  Sep 19, 2018


Time Seen by Provider:  13:00


This is a 58-year-old white female who is a poor historian.  She has a history 

of hepatitis be but does not have any awareness that she has cirrhosis of the 

liver.  He says she got the hepatitis B from an old boyfriend.  She was brought 

to the emergency room with complaints of increased falling and weakness over 

the last week or so.  She complains of having pain all over but that she has 

had that for years.  Her primary complaint this morning is that she's very very 

hungry and very very thirsty


Subjective/Events-last exam


Pt is sitting in bed while no complaints. I attempted to discuss her living 

situation with her andmy concerns but she stated she would not go to a nursing 

home. When asked multiple times about who lives in her house and would care for 

her she refused to answer the question.





Objective


Exam


Vital Signs





Vital Signs








  Date Time  Temp Pulse Resp B/P (MAP) Pulse Ox O2 Delivery O2 Flow Rate FiO2


 


18 08:34      Room Air  


 


18 08:00 97.7 107 16 148/74 (98) 94   





Capillary Refill : Less Than 3 SecondsLess Than 3 Seconds


General Appearance:  Chronically ill


Respiratory:  Lungs Clear, No Respiratory Distress


Cardiovascular:  Regular Rate, Rhythm, No Murmur


Gastrointestinal:  Normal Bowel Sounds, Non Tender, Soft


Neurologic/Psychiatric:  Alert, Disoriented





Results/Procedures


Lab


Patient resulted labs reviewed.





Assessment/Plan


Assessment and Plan


Assess & Plan/Chief Complaint


Sepsis





Diagnosis/Problems


Diagnosis/Problems





(1) Severe sepsis


Status:  Acute


Assessment & Plan:  Severe sepsis on arrival- now resolved


Concern for SBP


Continue Rocephin and Flagyl


fluid from paracentesis shows no growth (but done after antibiotics)


Bacteremic x1 with strep pneumo- pansensitive


White count improved





(2) Spontaneous bacterial peritonitis


Status:  Acute


Assessment & Plan:  Continue on abx as above


Cultures negative





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


Assessment & Plan:  Hepatitis panel negative





(4) Gastritis


Assessment & Plan:  EGD showed gastritis and antral mass


   Biopsies taken


Continue PPI


Qualifiers:  


   Gastritis type:  unspecified gastritis  Chronicity:  unspecified  Gastritis 

bleeding:  without bleeding  Qualified Codes:  K29.70 - Gastritis, unspecified, 

without bleeding


(5) Debility


Assessment & Plan:  PT/OT consulted- patient has been unwilling to work with 

them


Discussed with family they report she falls almost daily at home and regularly 

soils herself


   They have tried to place her in a NH but patient has resisted


Would benefit for at least SNF stay


 consulted


   May need adult welfare check when she discharges at home








Clinical Quality Measures


DVT/VTE Risk/Contraindication:


Risk Factor Score Per Nursin


RFS Level Per Nursing on Admit:  4+=Very High











JOHN SLADE MD Sep 19, 2018 1:07 pm

## 2018-09-19 NOTE — OCC THERAPY PROGRESS NOTE
Therapy Progress Note


Attempted OT evaluation at 1322. Pt resting in bed, opens eyes briefly when 

spoken to. Pt states she won't participate in therapy, but will not provide 

reason for refusal. Pt does state she is having pain, but does not state 

location or intensity. Pt in bed with needs met. Notified RN of pt's c/o pain 

and therapy refusal.


1, visit











ILDA PIERRE OT Sep 19, 2018 13:47

## 2018-09-20 VITALS — SYSTOLIC BLOOD PRESSURE: 146 MMHG | DIASTOLIC BLOOD PRESSURE: 78 MMHG

## 2018-09-20 VITALS — DIASTOLIC BLOOD PRESSURE: 77 MMHG | SYSTOLIC BLOOD PRESSURE: 162 MMHG

## 2018-09-20 VITALS — SYSTOLIC BLOOD PRESSURE: 151 MMHG | DIASTOLIC BLOOD PRESSURE: 67 MMHG

## 2018-09-20 LAB
BASOPHILS # BLD AUTO: 0 10^3/UL (ref 0–0.1)
BASOPHILS NFR BLD AUTO: 0 % (ref 0–10)
BUN/CREAT SERPL: 33
CALCIUM SERPL-MCNC: 9.1 MG/DL (ref 8.5–10.1)
CHLORIDE SERPL-SCNC: 106 MMOL/L (ref 98–107)
CO2 SERPL-SCNC: 16 MMOL/L (ref 21–32)
CREAT SERPL-MCNC: 1.09 MG/DL (ref 0.6–1.3)
EOSINOPHIL # BLD AUTO: 0 10^3/UL (ref 0–0.3)
EOSINOPHIL NFR BLD AUTO: 0 % (ref 0–10)
ERYTHROCYTE [DISTWIDTH] IN BLOOD BY AUTOMATED COUNT: 20.7 % (ref 10–14.5)
GFR SERPLBLD BASED ON 1.73 SQ M-ARVRAT: 51 ML/MIN
GLUCOSE SERPL-MCNC: 90 MG/DL (ref 70–105)
HCT VFR BLD CALC: 36 % (ref 35–52)
HGB BLD-MCNC: 12.6 G/DL (ref 11.5–16)
LYMPHOCYTES # BLD AUTO: 1.5 X 10^3 (ref 1–4)
LYMPHOCYTES NFR BLD AUTO: 19 % (ref 12–44)
MANUAL DIFFERENTIAL PERFORMED BLD QL: NO
MCH RBC QN AUTO: 29 PG (ref 25–34)
MCHC RBC AUTO-ENTMCNC: 35 G/DL (ref 32–36)
MCV RBC AUTO: 84 FL (ref 80–99)
MONOCYTES # BLD AUTO: 0.8 X 10^3 (ref 0–1)
MONOCYTES NFR BLD AUTO: 10 % (ref 0–12)
NEUTROPHILS # BLD AUTO: 5.5 X 10^3 (ref 1.8–7.8)
NEUTROPHILS NFR BLD AUTO: 71 % (ref 42–75)
PLATELET # BLD: 177 10^3/UL (ref 130–400)
PMV BLD AUTO: 10 FL (ref 7.4–10.4)
POTASSIUM SERPL-SCNC: 3.6 MMOL/L (ref 3.6–5)
RBC # BLD AUTO: 4.28 10^6/UL (ref 4.35–5.85)
SODIUM SERPL-SCNC: 137 MMOL/L (ref 135–145)
WBC # BLD AUTO: 7.8 10^3/UL (ref 4.3–11)

## 2018-09-20 RX ADMIN — ENOXAPARIN SODIUM SCH MG: 100 INJECTION SUBCUTANEOUS at 08:57

## 2018-09-20 RX ADMIN — BUSPIRONE HYDROCHLORIDE SCH MG: 15 TABLET ORAL at 08:57

## 2018-09-20 RX ADMIN — PANTOPRAZOLE SODIUM SCH MG: 40 INJECTION, POWDER, FOR SOLUTION INTRAVENOUS at 08:57

## 2018-09-20 RX ADMIN — Medication SCH ML: at 06:15

## 2018-09-20 RX ADMIN — METRONIDAZOLE SCH MLS/HR: 5 INJECTION, SOLUTION INTRAVENOUS at 06:11

## 2018-09-20 RX ADMIN — SUCRALFATE SCH GM: 1 TABLET ORAL at 06:14

## 2018-09-20 RX ADMIN — MICONAZOLE NITRATE SCH APPLIC: 20 POWDER TOPICAL at 08:58

## 2018-09-20 NOTE — DISCHARGE INST-SIMPLE/STANDARD
Discharge Inst-Standard


Patient Instructions/Follow Up


Plan of Care/Instructions/FU:  


Please continue to take your medications as written. I have stopped your


insulin as your blood sugars have been in the normal range without them.


Please follow up with a primary care physician to follow up this hospital


stay.


Activity as Tolerated:  Yes


Discharge Diet:  No Restrictions


Return to The Hospital For:  


Confusion, fever, worsening abdominal pain, if you feel you are getting


worse.





Planned Outpatient Orders/Ref.


Pneu Vac Indicated:  Yes











JOHN SLADE MD Sep 20, 2018 11:48 am

## 2018-09-20 NOTE — PHYSICAL THERAPY PROGRESS NOTE
Therapy Progress Note


Patient is in bed and sleeping. When PT attempted to wake patient, she 

attempted to hit this PT and yelled  "NO!".  PT continued to attempt, however, 

patient is uncooperative.  SW notified.


1 ref











RAHUL DE LEON PT Sep 20, 2018 10:22

## 2018-09-20 NOTE — DISCHARGE SUMMARY-HOSPITALIST
Diagnosis/Chief Complaint


Date of Admission


Sep 15, 2018 at 3:33 pm


Date of Discharge





Discharge Date:  Sep 20, 2018


Admission Diagnosis


1.  Sepsis with strep coccus pneumonia on Gram stain of the blood cultures-on 

Rocephin day number 2


2.  Cirrhosis of the liver with evidence of portal hypertension and ascites-

peritoneal fluid cultures pending we'll check an ammonia level


3.  History of hepatitis B- will check for hepatitis C as well


4.  Thickened gastric mucosa of required a EGD-Dr. Phelps to do early this next 

week-


5.  Thrombocytopenia most likely secondary to sequestration because of the 

splenomegaly


6.  Either mildly confused or low functioning state we'll check a serum ammonia 

level


Discharge Diagnosis





(1) Severe sepsis


Status:  Acute


Assessment & Plan:  Severe sepsis on arrival- now resolved


Concern for SBP


Continue Rocephin and Flagyl


fluid from paracentesis shows no growth (but done after antibiotics)


Bacteremic x1 with strep pneumo- pansensitive


White count improved





(2) Spontaneous bacterial peritonitis


Status:  Acute


Assessment & Plan:  Continue on abx as above


Cultures negative





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


Assessment & Plan:  Hepatitis panel negative





(4) Gastritis


Assessment & Plan:  EGD showed gastritis and antral mass


   Biopsies taken


Continue PPI





(5) Debility


Assessment & Plan:  PT/OT consulted- patient has been unwilling to work with 

them


Discussed with family they report she falls almost daily at home and regularly 

soils herself


   They have tried to place her in a NH but patient has resisted


Would benefit for at least SNF stay


 consulted


   May need adult welfare check when she discharges at home








Discharge Summary


Procedures/Consulations


Surgery- Dr Phelps





Discharge Physical Exam


Allergies:  


Coded Allergies:  


     Penicillins (Verified  Allergy, Unknown, 9/15/18)


     doxycycline (Verified  Allergy, Unknown, 9/15/18)


     levofloxacin (Verified  Allergy, Unknown, 9/15/18)


Vitals & I&Os





Vital Signs








  Date Time  Temp Pulse Resp B/P (MAP) Pulse Ox O2 Delivery O2 Flow Rate FiO2


 


18 12:00 98.0 108 20 162/77 (105) 95 Room Air  








General Appearance:  No Apparent Distress, Chronically ill


Respiratory:  Lungs Clear, No Respiratory Distress


Cardiovascular:  Regular Rate, Rhythm, No Murmur





Hospital Course


Pt was admitted for sepsis presumably from SBP. Surgery was consulted for 

paracentesis. This was done after antibiotics so unfortunately peritoneal fluid 

was negative on culture. She did have a positive blood culture that grew strep 

pneumonia. She responded well to IV antibiotics. She appears to be very weak 

and debilitated though. I discussed with her at length the benefits of nursing 

home placement and my medical recommendation that she receive care from a 

nursing facility. She adamantly declined this. I discussed with the family my 

concerns as well but patient again refused nursing home placement to them as 

well. She was discharged home in stable condition.


Labs (last 24 hrs)


Laboratory Tests


18 18:56: Glucometer 79


18 05:55: 


White Blood Count 7.8, Red Blood Count 4.28L, Hemoglobin 12.6, Hematocrit 36, 

Mean Corpuscular Volume 84, Mean Corpuscular Hemoglobin 29, Mean Corpuscular 

Hemoglobin Concent 35, Red Cell Distribution Width 20.7H, Platelet Count 177, 

Mean Platelet Volume 10.0, Neutrophils (%) (Auto) 71, Lymphocytes (%) (Auto) 19

, Monocytes (%) (Auto) 10, Eosinophils (%) (Auto) 0, Basophils (%) (Auto) 0, 

Neutrophils # (Auto) 5.5, Lymphocytes # (Auto) 1.5, Monocytes # (Auto) 0.8, 

Eosinophils # (Auto) 0.0, Basophils # (Auto) 0.0, Sodium Level 137, Potassium 

Level 3.6, Chloride Level 106, Carbon Dioxide Level 16L, Anion Gap 15H, Blood 

Urea Nitrogen 36H, Creatinine 1.09, Estimat Glomerular Filtration Rate 51, BUN/

Creatinine Ratio 33, Glucose Level 90, Calcium Level 9.1





Microbiology


9/15/18 Blood Culture - Preliminary, Resulted


          No growth


9/15/18 Gram Stain - Final, Complete


          


9/15/18 Body Fluid Culture - Final, Complete


          No growth


Patient resulted labs reviewed.





Discussion & Recommendations


Discharge Planning:  >30 minutes discharge planning





Discharge


Home Medications:





Active Scripts


Active


Cefdinir 300 Mg Capsule 300 Mg PO BID


Reported


Aspirin EC (Aspirin) 81 Mg Tablet.dr 81 Mg PO DAILY


Buspirone HCl 15 Mg Tablet 15 Mg PO BID


Losartan Potassium 25 Mg Tablet 25 Mg PO DAILY


Sertraline HCl 100 Mg Tablet 150 Mg PO DAILY


     TAKES 1 & 1/2 (100MG) TABLETS


Ventolin Hfa (Albuterol Sulfate) 1 Puff Puff 2 Puff IH TID PRN


     1 PUFF = 90 MCG


Donepezil HCl 10 Mg Tablet 10 Mg PO DAILY


Maxalt (Rizatriptan Benzoate) 10 Mg Tablet 10 Mg PO UD PRN


Amitriptyline HCl 50 Mg Tablet 50 Mg PO HS


Protonix (Pantoprazole Sodium) 20 Mg Tablet.dr 20 Mg PO DAILY


Leflunomide 20 Mg Tablet 20 Mg PO Q48H


Promethazine Tablet (Promethazine HCl) 25 Mg Tablet 25 Mg PO Q6H PRN


Lovastatin 40 Mg Tablet 40 Mg PO HS


Humira Pen (Adalimumab) 40 Mg/0.4 Ml Pen.ij.kit 40 Mg SQ SA


Hydrocodon-Acetaminophn  (Hydrocodone/Acetaminophen) 1 Each Tablet 1 Tab 

PO QID PRN MDD 5





Instructions to patient/family


Please see electronic discharge instructions given to patient.





Clinical Quality Measures


DVT/VTE Risk/Contraindication:


Risk Factor Score Per Nursin


RFS Level Per Nursing on Admit:  4+=Very High





Problem Qualifiers





(1) Gastritis:  


Gastritis type:  unspecified gastritis  Chronicity:  unspecified  Gastritis 

bleeding:  without bleeding  Qualified Codes:  K29.70 - Gastritis, unspecified, 

without bleeding








JOHN SLADE MD Sep 20, 2018 2:27 pm daughter

## 2018-09-21 ENCOUNTER — HOSPITAL ENCOUNTER (OUTPATIENT)
Dept: HOSPITAL 75 - ER | Age: 58
Setting detail: OBSERVATION
Discharge: NURSING, REHAB OR LONG-TERM CARE | End: 2018-09-21
Attending: INTERNAL MEDICINE | Admitting: INTERNAL MEDICINE
Payer: COMMERCIAL

## 2018-09-21 VITALS — WEIGHT: 226 LBS | HEIGHT: 62 IN | BODY MASS INDEX: 41.59 KG/M2

## 2018-09-21 VITALS — DIASTOLIC BLOOD PRESSURE: 67 MMHG | SYSTOLIC BLOOD PRESSURE: 140 MMHG

## 2018-09-21 VITALS — DIASTOLIC BLOOD PRESSURE: 70 MMHG | SYSTOLIC BLOOD PRESSURE: 132 MMHG

## 2018-09-21 VITALS — DIASTOLIC BLOOD PRESSURE: 85 MMHG | SYSTOLIC BLOOD PRESSURE: 163 MMHG

## 2018-09-21 VITALS — SYSTOLIC BLOOD PRESSURE: 147 MMHG | DIASTOLIC BLOOD PRESSURE: 62 MMHG

## 2018-09-21 DIAGNOSIS — M79.7: ICD-10-CM

## 2018-09-21 DIAGNOSIS — F43.10: ICD-10-CM

## 2018-09-21 DIAGNOSIS — F41.9: ICD-10-CM

## 2018-09-21 DIAGNOSIS — R53.81: ICD-10-CM

## 2018-09-21 DIAGNOSIS — I10: ICD-10-CM

## 2018-09-21 DIAGNOSIS — E11.9: ICD-10-CM

## 2018-09-21 DIAGNOSIS — K74.60: Primary | ICD-10-CM

## 2018-09-21 DIAGNOSIS — M81.0: ICD-10-CM

## 2018-09-21 DIAGNOSIS — M06.9: ICD-10-CM

## 2018-09-21 DIAGNOSIS — Z79.899: ICD-10-CM

## 2018-09-21 DIAGNOSIS — F12.90: ICD-10-CM

## 2018-09-21 DIAGNOSIS — R18.8: ICD-10-CM

## 2018-09-21 DIAGNOSIS — B19.10: ICD-10-CM

## 2018-09-21 DIAGNOSIS — J44.9: ICD-10-CM

## 2018-09-21 DIAGNOSIS — Z79.4: ICD-10-CM

## 2018-09-21 DIAGNOSIS — F32.9: ICD-10-CM

## 2018-09-21 DIAGNOSIS — K29.70: ICD-10-CM

## 2018-09-21 DIAGNOSIS — F17.210: ICD-10-CM

## 2018-09-21 LAB
ALBUMIN SERPL-MCNC: 3.2 GM/DL (ref 3.2–4.5)
ALP SERPL-CCNC: 122 U/L (ref 40–136)
ALT SERPL-CCNC: 58 U/L (ref 0–55)
APTT PPP: (no result) S
BACTERIA #/AREA URNS HPF: (no result) /HPF
BASOPHILS # BLD AUTO: 0 10^3/UL (ref 0–0.1)
BASOPHILS NFR BLD AUTO: 0 % (ref 0–10)
BILIRUB SERPL-MCNC: 1.5 MG/DL (ref 0.1–1)
BILIRUB UR QL STRIP: NEGATIVE
BUN/CREAT SERPL: 35
CALCIUM SERPL-MCNC: 9.4 MG/DL (ref 8.5–10.1)
CHLORIDE SERPL-SCNC: 106 MMOL/L (ref 98–107)
CO2 SERPL-SCNC: 17 MMOL/L (ref 21–32)
CREAT SERPL-MCNC: 1.07 MG/DL (ref 0.6–1.3)
EOSINOPHIL # BLD AUTO: 0 10^3/UL (ref 0–0.3)
EOSINOPHIL NFR BLD AUTO: 0 % (ref 0–10)
ERYTHROCYTE [DISTWIDTH] IN BLOOD BY AUTOMATED COUNT: 20.8 % (ref 10–14.5)
FIBRINOGEN PPP-MCNC: (no result) MG/DL
GFR SERPLBLD BASED ON 1.73 SQ M-ARVRAT: 52 ML/MIN
GLUCOSE SERPL-MCNC: 83 MG/DL (ref 70–105)
GLUCOSE UR STRIP-MCNC: NEGATIVE MG/DL
HCT VFR BLD CALC: 36 % (ref 35–52)
HGB BLD-MCNC: 12.3 G/DL (ref 11.5–16)
INR PPP: 1.8 (ref 0.8–1.4)
KETONES UR QL STRIP: (no result)
LEUKOCYTE ESTERASE UR QL STRIP: (no result)
LIPASE SERPL-CCNC: 137 U/L (ref 8–78)
LYMPHOCYTES # BLD AUTO: 1.1 X 10^3 (ref 1–4)
LYMPHOCYTES NFR BLD AUTO: 13 % (ref 12–44)
MANUAL DIFFERENTIAL PERFORMED BLD QL: NO
MCH RBC QN AUTO: 29 PG (ref 25–34)
MCHC RBC AUTO-ENTMCNC: 34 G/DL (ref 32–36)
MCV RBC AUTO: 83 FL (ref 80–99)
MONOCYTES # BLD AUTO: 0.8 X 10^3 (ref 0–1)
MONOCYTES NFR BLD AUTO: 9 % (ref 0–12)
NEUTROPHILS # BLD AUTO: 6.6 X 10^3 (ref 1.8–7.8)
NEUTROPHILS NFR BLD AUTO: 78 % (ref 42–75)
NITRITE UR QL STRIP: NEGATIVE
PH UR STRIP: 6 [PH] (ref 5–9)
PLATELET # BLD: 205 10^3/UL (ref 130–400)
PMV BLD AUTO: 9.4 FL (ref 7.4–10.4)
POTASSIUM SERPL-SCNC: 3.7 MMOL/L (ref 3.6–5)
PROT SERPL-MCNC: 7.4 GM/DL (ref 6.4–8.2)
PROT UR QL STRIP: (no result)
PROTHROMBIN TIME: 21.1 SEC (ref 12.2–14.7)
RBC # BLD AUTO: 4.31 10^6/UL (ref 4.35–5.85)
RBC #/AREA URNS HPF: (no result) /HPF
SODIUM SERPL-SCNC: 138 MMOL/L (ref 135–145)
SP GR UR STRIP: 1.02 (ref 1.02–1.02)
SQUAMOUS #/AREA URNS HPF: >50 /HPF
UROBILINOGEN UR-MCNC: NORMAL MG/DL
WBC # BLD AUTO: 8.4 10^3/UL (ref 4.3–11)
WBC #/AREA URNS HPF: (no result) /HPF
YEAST #/AREA URNS HPF: (no result) /HPF

## 2018-09-21 PROCEDURE — 71045 X-RAY EXAM CHEST 1 VIEW: CPT

## 2018-09-21 PROCEDURE — 80053 COMPREHEN METABOLIC PANEL: CPT

## 2018-09-21 PROCEDURE — 86141 C-REACTIVE PROTEIN HS: CPT

## 2018-09-21 PROCEDURE — 87088 URINE BACTERIA CULTURE: CPT

## 2018-09-21 PROCEDURE — 83690 ASSAY OF LIPASE: CPT

## 2018-09-21 PROCEDURE — 85610 PROTHROMBIN TIME: CPT

## 2018-09-21 PROCEDURE — 81000 URINALYSIS NONAUTO W/SCOPE: CPT

## 2018-09-21 PROCEDURE — 76705 ECHO EXAM OF ABDOMEN: CPT

## 2018-09-21 PROCEDURE — 83605 ASSAY OF LACTIC ACID: CPT

## 2018-09-21 PROCEDURE — 36415 COLL VENOUS BLD VENIPUNCTURE: CPT

## 2018-09-21 PROCEDURE — 90686 IIV4 VACC NO PRSV 0.5 ML IM: CPT

## 2018-09-21 PROCEDURE — 85025 COMPLETE CBC W/AUTO DIFF WBC: CPT

## 2018-09-21 PROCEDURE — 82140 ASSAY OF AMMONIA: CPT

## 2018-09-21 PROCEDURE — 76700 US EXAM ABDOM COMPLETE: CPT

## 2018-09-21 PROCEDURE — 51702 INSERT TEMP BLADDER CATH: CPT

## 2018-09-21 PROCEDURE — 87040 BLOOD CULTURE FOR BACTERIA: CPT

## 2018-09-21 PROCEDURE — 70450 CT HEAD/BRAIN W/O DYE: CPT

## 2018-09-21 NOTE — CONSULTATION
History of Present Illness


History of Present Illness


Patient Consulted On(richi/time)


18


 17:09


Date Seen by Provider:  Sep 21, 2018


Time Seen by Provider:  17:09


History of Present Illness


consult requested by Dr. Bello for cirrhosis, symptomatic ascites.





patient is a 58 year old who was here with abdominal pain, gastritis, cirrhosis 

with ascites, was concerning of sbp.  Had paracentesis performed.  Patient was 

discharged home yesterday but patient had to be carried by a sheet into the 

house.  She is not being verbal, which family states over the last 5 years she 

will not always verbalize.  Patient will speak only a couple words to me.  Pain 

is better, but she is having some minimal abdominal discomfort from fluid in 

abdomen.  She is willing to have another paracentesis performed.  Family would 

like her to have it done as well.  Family states she does not want to do 

anything herself.  U/s was performed showing moderate ascites and cirrhotic 

appearing liver.





Allergies and Home Medications


Allergies


Coded Allergies:  


     Penicillins (Verified  Allergy, Unknown, 9/15/18)


     doxycycline (Verified  Allergy, Unknown, 9/15/18)


     levofloxacin (Verified  Allergy, Unknown, 9/15/18)





Home Medications


Adalimumab 40 Mg/0.4 Ml Pen.ij.kit, 40 MG SQ Sa, (Reported)


Albuterol Sulfate 1 Puff Puff, 2 PUFF IH TID PRN for SHORTNESS OF BREATH, (

Reported)


   1 PUFF = 90 MCG 


Amitriptyline HCl 50 Mg Tablet, 50 MG PO HS, (Reported)


Aspirin 81 Mg Tablet.dr, 81 MG PO DAILY, (Reported)


Buspirone HCl 15 Mg Tablet, 15 MG PO BID, (Reported)


Cefdinir 300 Mg Capsule, 300 MG PO BID


   Prescribed by: JOHN SLADE on 18 1150


Donepezil HCl 10 Mg Tablet, 10 MG PO DAILY, (Reported)


Hydrocodone/Acetaminophen 1 Each Tablet, 1 TAB PO QID PRN for PAIN-MODERATE


   Prescribed by: JOHN SLADE on 18 1537


Leflunomide 20 Mg Tablet, 20 MG PO Q48H, (Reported)


Losartan Potassium 25 Mg Tablet, 25 MG PO DAILY, (Reported)


Lovastatin 40 Mg Tablet, 40 MG PO HS, (Reported)


Pantoprazole Sodium 20 Mg Tablet.dr, 20 MG PO DAILY, (Reported)


Promethazine HCl 25 Mg Tablet, 25 MG PO Q6H PRN for NAUSEA/VOMITING-1ST LINE, (

Reported)


Rizatriptan Benzoate 10 Mg Tablet, 10 MG PO UD PRN for MIGRAINE, (Reported)


Sertraline HCl 100 Mg Tablet, 150 MG PO DAILY, (Reported)


   TAKES 1 & 1/2 (100MG) TABLETS 





Patient Home Medication List


Home Medication List Reviewed:  Yes





Past Medical-Social-Family Hx


Patient Social History


Alcohol Use:  Denies Use


Recreational Drug Use:  Yes


Drug of Choice:  MARIJUANA


Type Used:  Cigarettes


Recent Foreign Travel:  No


Contact w/Someone Who Travel:  No


Recent Infectious Disease Expo:  No


Recent Hopitalizations:  Yes (2018)


Physical Abuse Screen:  No


Sexual Abuse:  No





Seasonal Allergies


Seasonal Allergies:  No





Surgeries


History of Surgeries:  Yes (BI LAT KNEES, RT FOOT, NECK)


Surgeries:   Section, Orthopedic





Respiratory


History of Respiratory Disorde:  Yes


Respiratory Disorders:  COPD





Cardiovascular


History of Cardiac Disorders:  Yes


Cardiac Disorders:  Hypertension





Neurological


History of Neurological Disord:  Yes





Genitourinary


History of Genitourinary Disor:  Yes (FREQUENCY)





Gastrointestinal


History of Gastrointestinal Di:  Yes (Hepatitis B)


Gastrointestinal Disorders:  Hepatitis, Cirrhosis





Musculoskeletal


History of Musculoskeletal Dis:  Yes


Musculoskeletal Disorders:  Degenerate Disk Disease, Osteoporosis, Arthritis, 

Fibromyalgia, Rheumatoid Arthritis, Chronic Back Pain





Endocrine


History of Endocrine Disorders:  Yes (IDDM PER FAMILY)





HEENT


History of HEENT Disorders:  No


Loss of Vision:  Denies


Hearing Impairment:  Denies





Cancer


History of Cancer:  No





Psychosocial


History of Psychiatric Problem:  Yes (PER FAMILY)


Behavioral Health Disorders:  Sleep Difficulties, Anxiety, PTSD, Violent 

Behavior, Depression





Integumentary


History of Skin or Integumenta:  Yes


Skin/Integumentary Disorders:  Eczema





Family Medical History


Significant Family History:  No Pertinent Family Hx





Review of Systems-General


ROS-Unable to Obtain:  patient not willing to participate in review of systems.





Physical Exam-General Problems


Physical Exam


Vital Signs





Vital Signs - First Documented








 18





 00:25 04:36


 


Temp 97.6 


 


Pulse 82 


 


Resp 14 


 


B/P (MAP) 142/88 (106) 


 


Pulse Ox 100 


 


O2 Delivery  Room Air





Capillary Refill : Less Than 3 Seconds


General Appearance:  no apparent distress


HEENT:  PERRL/EOMI


Neck:  supple


Respiratory:  no respiratory distress, no accessory muscle use


Cardiovascular:  regular rate, rhythm


Gastrointestinal:  non tender, soft, other (fluid wave present, no significant 

pain on palpation)


Rectal:  deferred


Back:  no CVA tenderness


Extremities:  non-tender


Neurologic/Psychiatric:  alert (minimal verbal communication, will nod or shake 

head to answer questions )


Skin:  warm/dry


Lymphatic:  no adenopathy





Data Review


Labs


Laboratory Tests


18 00:44: 


White Blood Count 8.4, Red Blood Count 4.31L, Hemoglobin 12.3, Hematocrit 36, 

Mean Corpuscular Volume 83, Mean Corpuscular Hemoglobin 29, Mean Corpuscular 

Hemoglobin Concent 34, Red Cell Distribution Width 20.8H, Platelet Count 205, 

Mean Platelet Volume 9.4, Neutrophils (%) (Auto) 78H, Lymphocytes (%) (Auto) 13

, Monocytes (%) (Auto) 9, Eosinophils (%) (Auto) 0, Basophils (%) (Auto) 0, 

Neutrophils # (Auto) 6.6, Lymphocytes # (Auto) 1.1, Monocytes # (Auto) 0.8, 

Eosinophils # (Auto) 0.0, Basophils # (Auto) 0.0, Prothrombin Time 21.1H, INR 

Comment 1.8H, Sodium Level 138, Potassium Level 3.7, Chloride Level 106, Carbon 

Dioxide Level 17L, Anion Gap 15H, Blood Urea Nitrogen 37H, Creatinine 1.07, 

Estimat Glomerular Filtration Rate 52, BUN/Creatinine Ratio 35, Glucose Level 83

, Lactic Acid Level 1.28, Calcium Level 9.4, Corrected Calcium 10.0, Total 

Bilirubin 1.5H, Aspartate Amino Transf (AST/SGOT) 76H, Alanine Aminotransferase 

(ALT/SGPT) 58H, Alkaline Phosphatase 122, C-Reactive Protein High Sensitivity 

7.12H, Total Protein 7.4, Albumin 3.2, Lipase 137H


18 01:04: Ammonia 53H


18 01:30: 


Urine Color AMBERH, Urine Clarity VERY CLOUDYH, Urine pH 6, Urine Specific 

Gravity 1.020, Urine Protein 2+H, Urine Glucose (UA) NEGATIVE, Urine Ketones 2+H

, Urine Nitrite NEGATIVE, Urine Bilirubin NEGATIVE, Urine Urobilinogen NORMAL, 

Urine Leukocyte Esterase 2+H, Urine RBC (Auto) 1+H, Urine RBC NONE, Urine WBC 2-

5, Urine Squamous Epithelial Cells >50H, Urine Crystals NONE, Urine Bacteria 

TRACE, Urine Casts NONE, Urine Mucus SMALLH, Urine Yeast MODERATEH, Urine 

Culture Indicated YES





Assessment/Plan


cirrhosis with symptomatic ascites


gastritis


patient not performing daily needs, family unable to provide.


patient's family needing nursing home care being arranged


patient and family discussed risks and benefits of u/s guided paracentesis and 

wish to proceed.


okay with transferring if can be arranged.





Clinical Quality Measures


DVT/VTE Risk/Contraindication:


Risk Factor Score Per Nursin


RFS Level Per Nursing on Admit:  4+=Very High











DAVID ARANDA DO Sep 21, 2018 17:16

## 2018-09-21 NOTE — HISTORY & PHYSICAL-HOSPITALIST
History of Present Illness


HPI/Chief Complaint


Pt is a 58yoCF known to me from recent admission who was admitted for inability 

to care for herself at home. She was just admitted to the hospitalist service 

for 5 days for sepsis from SBP. She was medically stable for discharge 

yesterday but felt to require nursing home placement for her safety. She 

adamantly refused this and family was willing to take her home as a trial. She 

was discharged yesterday and family was unable to even get her in her home and 

reportedly had to use a sheet to drag her in to the house. She is unwilling to 

tell me any of this history. She nods her head to state she knows why she is 

here and where she is but will not answer any questions. This is similar to her 

mental state when she was here during her first admission.


Source:  patient


Date Seen


18


Time Seen by a Provider:  08:29


Attending Physician


Rubin Bello MD


PCP


No,Local Physician


Referring Physician





Date of Admission


Sep 21, 2018 at 03:28





Home Medications & Allergies


Home Medications


Reviewed patient Home Medication Reconciliation performed by pharmacy 

medication reconciliations technician and/or nursing.


Patients Allergies have been reviewed.





Allergies





Allergies


Coded Allergies


  Penicillins (Verified Allergy, Unknown, 9/15/18)


  doxycycline (Verified Allergy, Unknown, 9/15/18)


  levofloxacin (Verified Allergy, Unknown, 9/15/18)








Past Medical-Social-Family Hx


Past Med/Social Hx:  Reviewed Nursing Past Med/Soc Hx


Patient Social History


Alcohol Use:  Denies Use


Recreational Drug Use:  Yes


Drug of Choice:  MARIJUANA


Type Used:  Cigarettes


Physical Abuse Screen:  No


Sexual Abuse:  No


Recent Foreign Travel:  No


Contact w/other who traveled:  No


Recent Hopitalizations:  Yes (2018)


Recent Infectious Disease Expo:  No





Seasonal Allergies


Seasonal Allergies:  No





Past Medical History


Surgeries:   Section, Orthopedic


Cardiac:  Hypertension


Gastrointestinal:  Hepatitis, Cirrhosis


Musculoskeletal:  Degenerate Disk Disease, Osteoporosis, Arthritis, Fibromyalgia

, Rheumatoid Arthritis, Chronic Back Pain


Loss of Vision:  Denies


Hearing Impairment:  Denies


Psychosocial:  Sleep Difficulties, Anxiety, PTSD, Violent Behavior, Depression


Skin/Integumentary:  Eczema





Family History


Reviewed Nursing Family Hx





Review of Systems


ROS-Unable to Obtain:  Would not answer any questions


Constitutional:  see HPI





Physical Exam


Physical Exam


Vital Signs





Vital Signs - First Documented








 18





 00:25 04:36


 


Temp 97.6 


 


Pulse 82 


 


Resp 14 


 


B/P (MAP) 142/88 (106) 


 


Pulse Ox 100 


 


O2 Delivery  Room Air





Capillary Refill : Less Than 3 Seconds


Height, Weight, BMI


Height: 5'2.00"


Weight: 226lbs. 0.0oz. 102.029027pc; 39.5 BMI


Method:Stated


General Appearance:  No Apparent Distress, Chronically ill, Obese


HEENT:  Moist Mucous Membranes; No Scleral Icterus (L), No Scleral Icterus (R)


Neck:  Normal Inspection; No JVD


Respiratory:  Lungs Clear, No Respiratory Distress


Cardiovascular:  Regular Rate, Rhythm, No Murmur


Gastrointestinal:  Normal Bowel Sounds, Non Tender, Soft, Other (no obvious 

distention or fluid wave but limited exam by habitus)


Extremity:  No Calf Tenderness, Pedal Edema (trace)


Neurologic/Psychiatric:  Alert





Results


Results/Procedures


Labs


Laboratory Tests


18 00:44








Patient resulted labs reviewed.





Assessment/Plan


Admission Diagnosis


Debility


Admission Status:  Observation





Diagnosis/Problems


Diagnosis/Problems





(1) Debility


Assessment & Plan:  Unable to care for self at home and family unable to as well


 consulted, appreciate assistance


PT/OT


There is not DPOA or guardianship arranged so will discuss with SW and family 

options for placement per family wishes





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


Assessment & Plan:  Lactolose mildly elevated


Will try Lactulose to see if helps


Discussed with Dr Phelps regarding ascites


Will get usg and possible paracentesis pending results





(3) Non-insulin dependent type 2 diabetes mellitus


Assessment & Plan:  Previously was on insulin but blood sugars WNL


Hold all diabetic meds and trend





(4) Gastritis


Assessment & Plan:  Continue on PPI


Qualifiers:  


   Gastritis type:  unspecified gastritis  Chronicity:  unspecified  Gastritis 

bleeding:  without bleeding  Qualified Codes:  K29.70 - Gastritis, unspecified, 

without bleeding


(5) Discharge planning issues


Assessment & Plan:  Pt still refusing NH placement


Will continue to discuss with family








Clinical Quality Measures


DVT/VTE Risk/Contraindication:


Risk Factor Score Per Nursin


RFS Level Per Nursing on Admit:  4+=Very High











JOHN SLADE MD Sep 21, 2018 8:29 am

## 2018-09-21 NOTE — SHORT STAY SUMMARY-HOSPITALIST
History of Present Illness


HPI/Chief Complaint


Pt is a 58yoCF known to me from recent admission who was admitted for inability 

to care for herself at home. She was just admitted to the hospitalist service 

for 5 days for sepsis from SBP. She was medically stable for discharge 

yesterday but felt to require nursing home placement for her safety. She 

adamantly refused this and family was willing to take her home as a trial. She 

was discharged yesterday and family was unable to even get her in her home and 

reportedly had to use a sheet to drag her in to the house. She is unwilling to 

tell me any of this history. She nods her head to state she knows why she is 

here and where she is but will not answer any questions. This is similar to her 

mental state when she was here during her first admission.


Exam Limitations:  clinical condition


Date Seen


18


Time Seen by a Provider:  08:15


Attending Physician


Rubin Bello MD


PCP


No,Local Physician


Referring Physician





Date of Admission


Sep 21, 2018 at 4:10 am





Home Medications & Allergies


Home Medications


Reviewed patient Home Medication Reconciliation performed by pharmacy 

medication reconciliations technician and/or nursing.


Patients Allergies have been reviewed.





Allergies





Allergies


Coded Allergies


  Penicillins (Verified Allergy, Unknown, 9/15/18)


  doxycycline (Verified Allergy, Unknown, 9/15/18)


  levofloxacin (Verified Allergy, Unknown, 9/15/18)








Past Medical-Social-Family Hx


Past Med/Social Hx:  Reviewed Nursing Past Med/Soc Hx


Patient Social History


Alcohol Use:  Denies Use


Recreational Drug Use:  Yes


Drug of Choice:  MARIJUANA


Type Used:  Cigarettes


Physical Abuse Screen:  No


Sexual Abuse:  No


Recent Foreign Travel:  No


Contact w/other who traveled:  No


Recent Hopitalizations:  Yes (2018)


Recent Infectious Disease Expo:  No





Seasonal Allergies


Seasonal Allergies:  No





Past Medical History


Surgeries:   Section, Orthopedic


Cardiac:  Hypertension


Gastrointestinal:  Hepatitis, Cirrhosis


Musculoskeletal:  Degenerate Disk Disease, Osteoporosis, Arthritis, Fibromyalgia

, Rheumatoid Arthritis, Chronic Back Pain


Loss of Vision:  Denies


Hearing Impairment:  Denies


Psychosocial:  Sleep Difficulties, Anxiety, PTSD, Violent Behavior, Depression


Skin/Integumentary:  Eczema





Family History


Reviewed Nursing Family Hx





Review of Systems


ROS-Unable to Obtain:  Unable to obtain as refuses to answer questions


Constitutional:  see HPI





Physical Exam


Physical Exam


Vital Signs





Vital Signs - First Documented








 18





 00:25 04:36


 


Temp 97.6 


 


Pulse 82 


 


Resp 14 


 


B/P (MAP) 142/88 (106) 


 


Pulse Ox 100 


 


O2 Delivery  Room Air





Capillary Refill : Less Than 3 Seconds


Height, Weight, BMI


Height: 5'2.00"


Weight: 226lbs. 0.0oz. 102.305147ui; 39.5 BMI


Method:Stated


General Appearance:  No Apparent Distress, Chronically ill


HEENT:  No Scleral Icterus (L), No Scleral Icterus (R)


Neck:  Normal Inspection; No JVD


Respiratory:  Lungs Clear, No Respiratory Distress


Cardiovascular:  Regular Rate, Rhythm, No Murmur


Gastrointestinal:  Normal Bowel Sounds, Soft; No Guarding, No Tenderness; Other 

(no obvious distention or fluid wave though exam limited by )


Extremity:  No Calf Tenderness, Pedal Edema


Neurologic/Psychiatric:  Alert, Other (appears oriented by how she shakes/nods 

head but unable to truly ascertain )





Results


Results/Procedures


Labs


Patient resulted labs reviewed.


Imaging:  Reviewed Imaging Report





Short Stay Diagnosis


Discharge Diagnosis-Short Stay


Admission Diagnosis


Debility


Final Discharge Diagnosis


Debility





Conclusion


Plan


See below





Diagnosis/Problems


Diagnosis/Problems





(1) Debility


Assessment & Plan:  Unable to care for self at home and family unable to as well


 consulted, appreciate assistance


PT/OT


Family in agreement with placement at nursing home for patient's safety


has been accepted at [facility]


   Plan to DC there today





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


Assessment & Plan:  Ammonia mildly elevated


Will try Lactulose to see if helps


Discussed with Dr Phelps regarding ascites


Usg showed moderate amount of ascites 


Surgery consulted, appreciate recs


paracentesis done prior to DC





(3) Non-insulin dependent type 2 diabetes mellitus


Assessment & Plan:  Previously was on insulin but blood sugars WNL


Hold all diabetic meds and trend





(4) Gastritis


Assessment & Plan:  Continue on PPI


Qualifiers:  


   Qualified Codes:  K29.70 - Gastritis, unspecified, without bleeding


(5) Discharge planning issues


Assessment & Plan:  Pt still declining NH placement despite being against her 

best interest


Discussed with family who agree she would be best service at a nursing home


Social work assistance greatly appreciated


Plan to DC to NH today








Clinical Quality Measures


DVT/VTE Risk/Contraindication:


Risk Factor Score Per Nursin


RFS Level Per Nursing on Admit:  4+=Very High











JOHN SLADE MD Sep 21, 2018 15:49

## 2018-09-21 NOTE — OPERATIVE REPORT
DATE OF SERVICE:  09/21/2018



PREOPERATIVE DIAGNOSIS:

Symptomatic ascites.



POSTOPERATIVE DIAGNOSIS:

Symptomatic ascites.



PROCEDURE:

Ultrasound-guided paracentesis.



SURGEON:

David Phelps DO



ANESTHESIA:

A 1% lidocaine, total of 5 mL.



ESTIMATED BLOOD LOSS:

None.



COMPLICATIONS:

None.



INDICATIONS:

The patient is a 58-year-old female with symptomatic ascites.  She understands

risks and benefits of the procedure along with family who wished to proceed. 

Consent was signed on the chart.



DESCRIPTION OF PROCEDURE:

The patient was prepped and draped in sterile fashion.  Timeout was performed. 

Ultrasound was used to find the best pocket and local anesthetic was infiltrated

into the skin.  A #11 blade scalpel was used to make a stab incision and using

the safety paracentesis needle and catheter this was advanced through the skin

until straw-colored fluid was withdrawn.  The catheter was then advanced into

the abdomen and the needle was removed.  A total of 3600 mL of straw-colored

fluid was withdrawn from the abdomen and the catheter was then removed and

sterile bandage was applied.  The patient tolerated procedure well without any

complications.





Job ID: [id number]

DocumentID: [id number]

Dictated Date:  09/21/2018 16:53:31

Transcription Date: 09/21/2018 22:05:15

Dictated By: DAVID PHELPS DO

## 2018-09-21 NOTE — DISCHARGE INST-SKILLED NURSING
Discharge Inst-Skilled NF


Chief Complaint


Pt is a 58yoCF known to me from recent admission who was admitted for inability 

to care for herself at home. She was just admitted to the hospitalist service 

for 5 days for sepsis from SBP. She was medically stable for discharge 

yesterday but felt to require nursing home placement for her safety. She 

adamantly refused this and family was willing to take her home as a trial. She 

was discharged yesterday and family was unable to even get her in her home and 

reportedly had to use a sheet to drag her in to the house. She is unwilling to 

tell me any of this history. She nods her head to state she knows why she is 

here and where she is but will not answer any questions. This is similar to her 

mental state when she was here during her first admission.





Patient Instructions


Patient Problems:  


Cirrhosis, ascites, ESLD





Consult/Follow Up/Orders


Follow Up Appt.:  


With medical director


Skilled NF Admit to:  [facility]


Certification (SNF)


I certify that SNF services are required to be given on an inpatient basis 

because of the above named patient's need for skilled nursing care on a 

continuing basis for the conditions(s) for which he/she was receiving inpatient 

hospital services prior to his/her transfer to the SNF.


Skilled Nursing Facility Order:  Nursing Services, Occupational Ther-Evaluate & 

Treat, Physical Therapy-Evaluate & Treat


Discharge Diet:  Low Sodium Diet


Daily Activity as Tolerated:  Yes





New & Resume Previous Orders


John Khoury 


Sep 21, 2018 


15:33











JOHN KHOURY MD Sep 21, 2018 3:35 pm

## 2018-09-21 NOTE — DIAGNOSTIC IMAGING REPORT
PROCEDURE: US abdomen, limited.



TECHNIQUE: Multiple realtime grayscale images were obtained over

the abdomen in various projections.



INDICATION: Ascites.



FINDINGS: Ultrasound guidance was utilized for localization of

free fluid in the abdomen for the planned paracentesis. A sizable

collection of free fluid was noted in the right abdomen and the

skin over the fluid was marked for the paracentesis to be

performed by Dr. Phelps. 



IMPRESSION: There has been ultrasound localization of a free

fluid collection in the right abdomen. Paracentesis is pending.



Dictated by: 



  Dictated on workstation # [id number]

## 2018-09-21 NOTE — PHYSICAL THERAPY EVALUATION
PT Evaluation-General


Medical Diagnosis


Admission Date


Sep 21, 2018 at 03:28


Medical Diagnosis:  AMS/abdominal pain


Onset Date:  Sep 20, 2018





Therapy Diagnosis


Therapy Diagnosis:  severe debility/weakness





Height/Weight


Height (Feet):  5


Height (Inches):  2.00


Weight (Pounds):  226


Weight (Ounces):  0.0





Precautions


Precautions/Isolations:  Fall Prevention, Standard Precautions





Weight Bear Status


Right Lower Extremity:  Right


Weight Bearing/Tolerated


Left Lower Extremity:  Left


Weight Bearing/Tolerated





Referral


Physician:  Iraida


Reason for Referral:  Evaluation/Treatment





Medical History


Pertinent Medical History:  COPD, DM, HTN, Rheumatic Heart Disease


Additional Medical History


cirrhosis, Hep. B, drug use


Current History


Patient dismissed from hospital 18 to home with family.  Family had to lay 

on a blanket and drag her into the home where she was unable to assist or be 

continent with bodily fluids.  EMS for patient return


Reviewed History:  Yes





Social History


Home:  Single Level


Current Living Status:  Other Family





Prior/Core FIM


Prior Level of Function


              Functional [place] Measure


0=Not Assessed/NA   4=Minimal Assistance


1=Total Assistance   5=Supervision or Setup


2=Maximal Assistance   6=Modified [place]


3=Moderate Assistance   7=Complete [place]


Bed Mobility:  5


Transfers (B,C,W/C) (FIM):  5


Gait:  1


ambulates short distances only





PT Evaluation-Current


Subjective


Patient is in bed.  Does not speak.  Answers by nodding and shaking her head.





Pain





   Numeric Pain Scale:  5-Moderate Pain


   Location:  Right, Lower


   Location Body Site:  Abdomen


   Pain Description:  Pressure


   Comment:  FLACC





Objective


Patient Orientation:  Confused, Non-Verbal/Aphasic


Problem Solving:  Poor


Attachments:  Lynne Catheter





ROM/Strength


ROM Lower Extremities


bilateral LE WFL


Strength Lower Extremities


3-/5 grossly with no formal testing secondary to patient resisting all mobility





Integumentary/Posture


Integumentary


refer to nursing notes


Bowel Incontinence:  Yes


Bladder Incontinence:  Lynne Cath





Neuromuscular


(Tone, Coordination, Reflexes)


severely diminished due to weakness and inactivity





Sensory


Vision:  Functional


Hearing:  Functional


Sensation Right Lower Extremit:  Impaired


Sensation Left Lower Extremity:  Impaired





Transfers


              Functional [place] Measure


0=Not Assessed/NA   4=Minimal Assistance


1=Total Assistance   5=Supervision or Setup


2=Maximal Assistance   6=Modified [place]


3=Moderate Assistance   7=Complete [place]


Transfers (B, C, W/C) (FIM):  1


Scootin


Rollin


Patient is very physically resistive with attempt to mobilize in bed and to sit 

EOB.  Patient was shaking her head "no" and was able to return her LE's to a 

straightened position in bed.  Assisted US on mobility.





Assessment/Needs


58 y.o. female, will benefit from skilled PT to address functional strength and 

mobility to improve current LOF.  Patient is currently having difficulty with 

participating with therapy.  Physician is addressing all possible medical 

reasons.  PT to increase activity as testing is completed and as indicated.


Rehab Potential:  Guarded





PT Long Term Goals


Long Term Goals


PT Long Term Goals Time Frame:  Oct 20, 2018


Transfers (B,C,W/C) (FIM):  3


Gait (FIM):  1


Gait distance (FIM):  1=up to 49 ft


Distance:  10'


Gait Level of Assist:  3


Gait Assistive Device:  FWW





PT Plan


Problem List


Problem List:  Activity Tolerance, Functional Strength, Safety, Balance, Gait, 

Transfer, Bed Mobility





Treatment/Plan


Treatment Plan:  Continue Plan of Care


Treatment Plan:  Bed Mobility, Education, Functional Activity Susan, Functional 

Strength, Gait, Safety, Therapeutic Exercise, Transfers


Treatment Duration:  Oct 20, 2018


Frequency:  6 times per week


Estimated Hrs Per Day:  .5 hour per day


Patient and/or Family Agrees t:  Yes





Discharge Recommendations


Therapy D/C Recommendations:  Nursing Home Placement, Skilled Nursing (TCU/NH)





Time/GCodes


Time In:  1050


Time Out:  1110


Total Billed Treatment Time:  20


Total Billed Treatment


1 visit


EVHighC 20 min


G Codes Necessary:  Yes





PT/OT Therapy GCodes


Therapy


Functional Limitation:  Physical Therapy


Test(s)/Tool used to determine:  FIM





Functional Limitation-Current


Charge Code:  MOBCUR


Modifier:  CN





Functional Limitation-Goal


Charge Code:  MOBGOAL


Modifier:  CL











RAHUL DE LEON PT Sep 21, 2018 11:20

## 2018-09-21 NOTE — DIAGNOSTIC IMAGING REPORT
INDICATION:  Abdominal pain.



TECHNIQUE:  Single view chest 1:00 a.m..



CORRELATION STUDY:  09/15/2018



FINDINGS: 

Overall limited depth of inspiration with crowding and

atelectasis of the lung bases.  Heart size enlarged with a mild

vascular congestion appears adversely increased from prior study.

Cervical spinal fusion hardware present. Calcification of  soft

tissues neck suspect for carotid artery calcification.



IMPRESSION: 

1. Limited depth of inspiration. Given this, there does appear to

be cardiac enlargement with borderline vasculature.     



Dictated by: 



  Dictated on workstation # [id number]

## 2018-09-21 NOTE — DIAGNOSTIC IMAGING REPORT
PROCEDURE: US abdomen complete.



TECHNIQUE: Multiple real-time grayscale images were obtained over

the abdomen in various projections.



INDICATION: Ascites, abdominal pain.



FINDINGS: There are no prior ultrasound examinations available

for comparison. The CT abdomen/pelvis exam of 09/15/2018 did note

cirrhosis, borderline splenomegaly, and a moderate amount of

ascites. There was also marked thickening of the gastric wall

suggesting gastritis.



On this exam, there is again evidence of free fluid within the

abdomen and pelvis. The largest collection of free fluid is in

the right lower quadrant. The skin over the ascites was marked

for potential paracentesis.



The liver is small. There is no focal mass involving the liver

and the biliary tree is not dilated. The spleen measures 10.0 x

6.5 x 6.6 cm in size. The kidneys are normal in size and within

normal limits.



The gallbladder, common bile duct, pancreas, the aorta, and the

inferior vena cava are not well imaged.



The stomach is not well visualized.



IMPRESSION:

1. There is a moderate amount of ascites present and the largest 

collection of fluid was marked in the right lower quadrant.

2. This liver is small and there is mild enlargement of the

spleen. These findings are most likely related to the patient's

diagnosis of cirrhosis.



Dictated by: 



  Dictated on workstation # [id number]

## 2018-09-21 NOTE — DIAGNOSTIC IMAGING REPORT
INDICATION: Altered mental status.



Noncontrast brain CT is performed and compared to 09/15/2018.



There are no extra-axial fluid collections. No intracranial

hemorrhage. No intracranial mass or mass effect. No midline

shift. The ventricles are normal in size and position. There are

no focal parenchymal abnormalities in the brain. Calvarial

windows are unremarkable.



IMPRESSION: Negative noncontrast brain CT.



Dictated by: 



  Dictated on workstation # [id number]

## 2018-09-28 ENCOUNTER — HOSPITAL ENCOUNTER (OUTPATIENT)
Dept: HOSPITAL 75 - RAD | Age: 58
End: 2018-09-28
Attending: FAMILY MEDICINE
Payer: MEDICARE

## 2018-09-28 DIAGNOSIS — R18.8: Primary | ICD-10-CM

## 2018-09-28 DIAGNOSIS — B19.10: ICD-10-CM

## 2018-09-28 PROCEDURE — 49083 ABD PARACENTESIS W/IMAGING: CPT

## 2018-10-01 NOTE — OPERATIVE REPORT
DATE OF SERVICE:  09/28/2018



PREPROCEDURE DIAGNOSIS:

Symptomatic ascites.



POSTOPERATIVE DIAGNOSIS:

Symptomatic ascites.



PROCEDURE:

Ultrasound-guided paracentesis.



SURGEON:

David Phelps DO



ANESTHESIA:

Local 1% lidocaine 5 mL.



ESTIMATED BLOOD LOSS:

Minimal.



COMPLICATIONS:

None.



INDICATIONS:

The patient is a 58-year-old female with symptomatic ascites.  She and family

understand risks and benefits of procedure and wished to proceed.  Consent was

signed and on the chart.



DESCRIPTION OF PROCEDURE:

The patient was inspected with ultrasound and area was located for best site for

paracentesis.  The patient was prepped and draped in sterile fashion.  Timeout

was performed.  Local anesthetic was infiltrated into the area of insertion

point.  An 11 blade scalpel was used to make a skin incision.  Ultrasound was

used to guide the safety paracentesis needle and catheter into the fluid

collection.  Once this wilbur back straw colored fluid, the catheter was then

advanced.  A total of 4000 mL of straw colored fluid was withdrawn.  The

catheter was then removed and sterile bandage was applied.  The patient

tolerated procedure well without any complications.  She was sent to recovery

room in stable condition, to be discharged.





Job ID: [id number]

DocumentID: [id number]

Dictated Date:  10/01/2018 19:55:42

Transcription Date: 10/01/2018 23:27:15

Dictated By: DAVID PHELPS DO

## 2018-11-17 NOTE — PROGRESS NOTE
Subjective


Date Seen by Provider:  Sep 16, 2018


Time Seen by Provider:  04:36


Subjective/Events-last exam


patient having urge to urinate quite a bit.


still having abdominal pain mostly in left upper quadrant.


having some nausea, no emesis


not had any fever since on the floor.





Focused Exam


Lactate Level


9/15/18 13:48: Lactic Acid Level 3.23*H


9/15/18 17:18: Lactic Acid Level 2.03*H





Objective


Exam





Vital Signs








  Date Time  Temp Pulse Resp B/P (MAP) Pulse Ox O2 Delivery O2 Flow Rate FiO2


 


18 04:00 98.7 92 20 136/71 (92) 97 Room Air  


 


18 01:00  97      


 


18 00:00 99.2 100 20 134/70 (91) 92 Room Air  


 


9/15/18 20:00 100.0 103 20 122/70 (87) 92 Room Air  


 


9/15/18 19:00  100      


 


9/15/18 18:49      Room Air  


 


9/15/18 18:22  101      


 


9/15/18 17:38 99.4 98 20 120/69 (86) 96 Room Air  


 


9/15/18 17:26  88 20 126/70 96 Room Air  


 


9/15/18 13:33 101.1 93 20 145/77 (99) 94   


 


9/15/18 13:33 101.1 93 20 145/77 (99) 94 Room Air  














I & O 


 


 18





 07:00


 


Intake Total 1100 ml


 


Output Total 125 ml


 


Balance 975 ml





Capillary Refill : Less Than 3 SecondsLess Than 3 Seconds


General Appearance:  No Apparent Distress, WD/WN


HEENT:  PERRL/EOMI, TMs Normal, Other (small ecchymosis to the right side of 

the forehead. No Winn sign or raccoon eyes)


Neck:  Full Range of Motion, Normal Inspection


Respiratory:  Normal Breath Sounds, No Accessory Muscle Use, No Respiratory 

Distress


Cardiovascular:  Regular Rate, Rhythm, Normal Peripheral Pulses


Gastrointestinal:  distended, other (fluid wave, tenderness left upper quadrant 

same as yesterday)


Extremity:  Normal Capillary Refill, Normal Inspection


Neurologic/Psychiatric:  Alert, Oriented x3, No Motor/Sensory Deficits


Skin:  Normal Color, Warm/Dry





Results


Lab


Laboratory Tests


9/15/18 13:30: 


Urine Color AMBERH, Urine Clarity SLIGHTLY CLOUDY, Urine pH 6, Urine Specific 

Gravity 1.020, Urine Protein 2+H, Urine Glucose (UA) NEGATIVE, Urine Ketones 1+H

, Urine Nitrite NEGATIVE, Urine Bilirubin 2+H, Urine Urobilinogen 4H, Urine 

Leukocyte Esterase 1+H, Urine RBC (Auto) 3+H, Urine RBC 5-10H, Urine WBC 2-5, 

Urine Squamous Epithelial Cells 2-5, Urine Crystals NONE, Urine Bacteria FEWH, 

Urine Casts PRESENT, Urine Hyaline Casts 2-5H, Urine Mucus MODERATEH, Urine 

Culture Indicated NO


9/15/18 13:48: 


White Blood Count 18.0H, Red Blood Count 4.57, Hemoglobin 12.9, Hematocrit 39, 

Mean Corpuscular Volume 86, Mean Corpuscular Hemoglobin 28, Mean Corpuscular 

Hemoglobin Concent 33, Red Cell Distribution Width 20.1H, Platelet Count 137, 

Mean Platelet Volume 10.9H, Neutrophils (%) (Auto) 90H, Lymphocytes (%) (Auto) 

5L, Monocytes (%) (Auto) 5, Eosinophils (%) (Auto) 0, Basophils (%) (Auto) 0, 

Neutrophils # (Auto) 16.3H, Lymphocytes # (Auto) 0.9L, Monocytes # (Auto) 0.9, 

Eosinophils # (Auto) 0.0, Basophils # (Auto) 0.0, Neutrophils % (Manual) 79, 

Lymphocytes % (Manual) 9, Monocytes % (Manual) 7, Eosinophils % (Manual) 0, 

Basophils % (Manual) 0, Band Neutrophils 5, Anisocytosis SLIGHT, Prothrombin 

Time 17.9H, INR Comment 1.5H, Sodium Level 134L, Potassium Level 3.6, Chloride 

Level 99, Carbon Dioxide Level 21, Anion Gap 14, Blood Urea Nitrogen 18, 

Creatinine 0.90, Estimat Glomerular Filtration Rate > 60, BUN/Creatinine Ratio 

20, Glucose Level 158H, Lactic Acid Level 3.23*H, Calcium Level 9.0, Corrected 

Calcium 9.6, Total Bilirubin 1.8H, Aspartate Amino Transf (AST/SGOT) 112H, 

Alanine Aminotransferase (ALT/SGPT) 66H, Alkaline Phosphatase 129, Total 

Protein 7.1, Albumin 3.2


9/15/18 16:10: 


Body Fluid Source PERITONEAL, Body Fluid Color YELLOW, Body Fluid Appearance 

HAZY, Body Fluid , Body Fluid RBC 41, Body Fluid Polynuclear WBCs 35, 

Body Fluid Mononuclear WBCs 53, Body Fluid Lymphocytes 12, Body Fluid Other 

Cells , Body Fluid Glucose 175, Body Fluid Total Protein 0.8, Body Fluid 

Albumin 0.5, Body Fluid Lactate Dehydrogenase 66


9/15/18 17:18: Lactic Acid Level 2.03*H





Assessment/Plan


LUQ Abdominal pain


ascites


gastritis versus neoplasm with possible intramural air by ct


Consideration sbp and asked to do paracentesis which was performed and took off 

1860 mL of straw colored fluid.


Protonix 40 mg bid IV


IV abx to cover for sbp


labs pending today


will follow


will need EGD  in near future to further evaluate.





Clinical Quality Measures


DVT/VTE Risk/Contraindication:


Risk Factor Score Per Nursin


RFS Level Per Nursing on Admit:  4+=Very High











DAVID ARANDA DO Sep 16, 2018 04:39
Subjective


Date Seen by Provider:  Sep 17, 2018


Time Seen by Provider:  08:30


Subjective/Events-last exam


Feeling a little bit better today than yesterday.  NPO.  Pain still in left 

upper quadrant, moderate to severe.


Having nausea.  Wbc  down slightly today. Denies fever sweats chills shortness 

of breath or chest pain.





Focused Exam


Lactate Level


9/15/18 13:48: Lactic Acid Level 3.23*H


9/15/18 17:18: Lactic Acid Level 2.03*H





Objective


Exam





Vital Signs








  Date Time  Temp Pulse Resp B/P (MAP) Pulse Ox O2 Delivery O2 Flow Rate FiO2


 


18 07:57 97.8 104 20 159/83 (108) 90 Room Air  


 


18 07:00  112      


 


18 04:00 98.6 105 20 140/79 (99) 93 Room Air  


 


18 01:00  120      


 


18 00:00 98.7 102 28 139/78 (98) 92 Room Air  


 


18 20:00 99.1 105 24 163/79 (107) 93 Room Air  


 


18 19:51  102      


 


18 16:00 98.9 96 20 140/73 (95) 94 Room Air  


 


18 13:00  100      


 


18 12:29 96.7 97 22 172/84 (113) 98 Room Air  














I & O 


 


 18





 07:00


 


Intake Total 3270 ml


 


Output Total 350 ml


 


Balance 2920 ml





Capillary Refill : Less Than 3 SecondsLess Than 3 Seconds


General Appearance:  Chronically ill, Obese


HEENT:  Other (Dry oral mucosa)


Neck:  Non Tender


Respiratory:  Chest Non Tender, Lungs Clear, No Accessory Muscle Use


Cardiovascular:  Regular Rate, Rhythm, No Gallop, Systolic Murmur


Gastrointestinal:  distended, other (fluid wave, tenderness left upper quadrant 

same as yesterday)


Extremity:  Non Tender, No Pedal Edema


Neurologic/Psychiatric:  Alert


Skin:  Warm/Dry, Pallor





Results


Lab


Laboratory Tests


18 12:27: Ammonia 48H


18 17:41: Glucometer 121H


18 05:49: 


White Blood Count 15.0H, Red Blood Count 4.39, Hemoglobin 12.3, Hematocrit 38, 

Mean Corpuscular Volume 86, Mean Corpuscular Hemoglobin 28, Mean Corpuscular 

Hemoglobin Concent 33, Red Cell Distribution Width 20.7H, Platelet Count 165, 

Mean Platelet Volume 10.8H, Neutrophils (%) (Auto) 78H, Lymphocytes (%) (Auto) 

13, Monocytes (%) (Auto) 8, Eosinophils (%) (Auto) 0, Basophils (%) (Auto) 0, 

Neutrophils # (Auto) 11.7H, Lymphocytes # (Auto) 2.0, Monocytes # (Auto) 1.3H, 

Eosinophils # (Auto) 0.0, Basophils # (Auto) 0.0, Sodium Level 138, Potassium 

Level 3.5L, Chloride Level 106, Carbon Dioxide Level 19L, Anion Gap 13, Blood 

Urea Nitrogen 28H, Creatinine 1.15, Estimat Glomerular Filtration Rate 48, BUN/

Creatinine Ratio 24, Glucose Level 99, Calcium Level 8.9, Corrected Calcium 9.6

, Total Bilirubin 1.2H, Aspartate Amino Transf (AST/SGOT) 69H, Alanine 

Aminotransferase (ALT/SGPT) 63H, Alkaline Phosphatase 137H, Total Protein 6.8, 

Albumin 3.1L





Microbiology


9/15/18 Blood Culture - Preliminary, Resulted


          No growth


9/15/18 Gram Stain - Final, Resulted


          


9/15/18 Body Fluid Culture - Preliminary, Resulted


          No growth





Assessment/Plan


LUQ Abdominal pain


ascites


gastritis versus neoplasm with possible intramural air by ct


cirrhosis


Consideration sbp and asked to do paracentesis which was performed and took off 

1860 mL of straw colored fluid.


Protonix 40 mg bid IV


IV abx to cover for sbp added Flagyl yesterda


labs pending today


will follow


will plan egd today





Clinical Quality Measures


DVT/VTE Risk/Contraindication:


Risk Factor Score Per Nursin


RFS Level Per Nursing on Admit:  4+=Very High











DAVID ARANDA DO Sep 17, 2018 10:23
Subjective


Date Seen by a Provider:  Sep 18, 2018


Time Seen by a Provider:  11:15


Subjective/Events-last exam


patient feeling better today.  tolerating clears.  wbc trending down.


denies n/v fever sweats chills shortness of breath or chest pain.





Focused Exam


Lactate Level


9/15/18 13:48: Lactic Acid Level 3.23*H


9/15/18 17:18: Lactic Acid Level 2.03*H





Objective


Exam





Vital Signs








  Date Time  Temp Pulse Resp B/P (MAP) Pulse Ox O2 Delivery O2 Flow Rate FiO2


 


18 09:34 97.4 106 16 133/62 (85) 94 Room Air  


 


18 09:32 97.4 106 16 133/62 (85) 94 Room Air  


 


18 08:00      Room Air  


 


18 08:00 97.4 106 16 133/62 (85) 94 Room Air  


 


18 07:00  105      


 


18 04:00 98.2 102 16 144/87 (106) 90 Room Air  


 


18 01:00  104      


 


18 00:00 97.6 102 21 142/83 (102) 93 Room Air  


 


18 20:22 98.7 107 20 179/81 (113) 95 Room Air  


 


18 20:00      Room Air  


 


18 19:00  120      


 


18 16:23 97.7 103 20 147/74 (98) 96 Room Air  


 


18 13:00  98      


 


18 12:12      Room Air  


 


18 12:00 96.0 101 20 152/68 (96) 90 Room Air  














I & O 


 


 18





 07:00


 


Intake Total 1400 ml


 


Output Total 850 ml


 


Balance 550 ml





Capillary Refill : Less Than 3 SecondsLess Than 3 Seconds


General Appearance:  No Apparent Distress, Chronically ill


HEENT:  Other (Dry oral mucosa)


Neck:  Non Tender


Respiratory:  Lungs Clear, No Respiratory Distress


Cardiovascular:  Regular Rate, Rhythm, No Murmur


Gastrointestinal:  distended, other (fluid wave, less tenderness left upper 

quadrant )


Extremity:  Non Tender, No Pedal Edema


Neurologic/Psychiatric:  Alert, Oriented x3


Skin:  Warm/Dry, Pallor


Lymphatic:  No Adenopathy





Results


Lab


Laboratory Tests


18 05:15: 


White Blood Count 12.4H, Red Blood Count 4.18L, Hemoglobin 12.2, Hematocrit 35, 

Mean Corpuscular Volume 85, Mean Corpuscular Hemoglobin 29, Mean Corpuscular 

Hemoglobin Concent 35, Red Cell Distribution Width 20.4H, Platelet Count 155, 

Mean Platelet Volume 10.4, Neutrophils (%) (Auto) 77H, Lymphocytes (%) (Auto) 16

, Monocytes (%) (Auto) 7, Eosinophils (%) (Auto) 0, Basophils (%) (Auto) 0, 

Neutrophils # (Auto) 9.6H, Lymphocytes # (Auto) 2.0, Monocytes # (Auto) 0.9, 

Eosinophils # (Auto) 0.0, Basophils # (Auto) 0.0, Sodium Level 136, Potassium 

Level 3.7, Chloride Level 105, Carbon Dioxide Level 16L, Anion Gap 15H, Blood 

Urea Nitrogen 30H, Creatinine 0.93, Estimat Glomerular Filtration Rate > 60, BUN

/Creatinine Ratio 32, Glucose Level 88, Calcium Level 8.6, Corrected Calcium 9.5

, Total Bilirubin 1.1H, Aspartate Amino Transf (AST/SGOT) 60H, Alanine 

Aminotransferase (ALT/SGPT) 58H, Alkaline Phosphatase 111, Ammonia 61H, Total 

Protein 6.6, Albumin 2.9L





Microbiology


9/15/18 Blood Culture - Preliminary, Resulted


          No growth


9/15/18 Gram Stain - Final, Resulted


          


9/15/18 Body Fluid Culture - Preliminary, Resulted


          No growth





Assessment/Plan


LUQ Abdominal pain


ascites


gastritis and antral mass/inflammatory changes


cirrhosis


Consideration sbp and asked to do paracentesis which was performed and took off 

1860 mL of straw colored fluid.


Protonix 40 mg bid IV


IV abx to cover for sbp added Flagyl 


wbc improving and feeling better


will follow


]





Clinical Quality Measures


DVT/VTE Risk/Contraindication:


Risk Factor Score Per Nursin


RFS Level Per Nursing on Admit:  4+=Very High











DAVID ARANDA DO Sep 18, 2018 11:23
Subjective


Date Seen by a Provider:  Sep 19, 2018


Time Seen by a Provider:  08:11


Subjective/Events-last exam


Patient feeling better today.  No new complaints.  Unable to do basic needs or 

refusing to at this time.  Denies n/v fever sweats chills shortness of breath 

or chest pain.





Objective


Exam





Vital Signs








  Date Time  Temp Pulse Resp B/P (MAP) Pulse Ox O2 Delivery O2 Flow Rate FiO2


 


18 03:58 97.8 103 20 146/78 (100) 96 Room Air  


 


18 23:38 96.5 105 20 141/73 (95) 95 Room Air  


 


18 20:35 97.1 110 20 143/70 (94) 93 Room Air  


 


18 17:12  101 20  97 Room Air  


 


18 16:46 98.6 113 16 153/74 (100) 94 Room Air  


 


18 12:00 97.0 108 16 155/81 (105) 94 Room Air  


 


18 08:34      Room Air  














I & O 


 


 18





 07:00


 


Intake Total 1100 ml


 


Output Total 900 ml


 


Balance 200 ml





Capillary Refill : Less Than 3 SecondsLess Than 3 Seconds


General Appearance:  Chronically ill


HEENT:  Other (Dry oral mucosa)


Neck:  Non Tender


Respiratory:  Lungs Clear, No Respiratory Distress


Cardiovascular:  Regular Rate, Rhythm, No Murmur


Gastrointestinal:  distended, other (fluid wave, less tenderness left upper 

quadrant )


Extremity:  Non Tender, No Pedal Edema


Neurologic/Psychiatric:  Alert, Disoriented


Skin:  Warm/Dry, Pallor


Lymphatic:  No Adenopathy





Results


Lab


Laboratory Tests


18 18:56: Glucometer 79


18 05:55: 


White Blood Count 7.8, Red Blood Count 4.28L, Hemoglobin 12.6, Hematocrit 36, 

Mean Corpuscular Volume 84, Mean Corpuscular Hemoglobin 29, Mean Corpuscular 

Hemoglobin Concent 35, Red Cell Distribution Width 20.7H, Platelet Count 177, 

Mean Platelet Volume 10.0, Neutrophils (%) (Auto) 71, Lymphocytes (%) (Auto) 19

, Monocytes (%) (Auto) 10, Eosinophils (%) (Auto) 0, Basophils (%) (Auto) 0, 

Neutrophils # (Auto) 5.5, Lymphocytes # (Auto) 1.5, Monocytes # (Auto) 0.8, 

Eosinophils # (Auto) 0.0, Basophils # (Auto) 0.0, Sodium Level 137, Potassium 

Level 3.6, Chloride Level 106, Carbon Dioxide Level 16L, Anion Gap 15H, Blood 

Urea Nitrogen 36H, Creatinine 1.09, Estimat Glomerular Filtration Rate 51, BUN/

Creatinine Ratio 33, Glucose Level 90, Calcium Level 9.1





Microbiology


9/15/18 Blood Culture - Preliminary, Resulted


          No growth


9/15/18 Gram Stain - Final, Complete


          


9/15/18 Body Fluid Culture - Final, Complete


          No growth





Assessment/Plan


LUQ Abdominal pain


ascites


gastritis and antral mass/inflammatory changes path ulceration and consistent 

with hemangioma


cirrhosis


Consideration sbp and asked to do paracentesis which was performed and took off 

1860 mL of straw colored fluid.


Protonix 40 mg bid IV


IV abx to cover for sbp 


 feeling better


will follow


]





Clinical Quality Measures


DVT/VTE Risk/Contraindication:


Risk Factor Score Per Nursin


RFS Level Per Nursing on Admit:  4+=Very High











DAVID ARANDA DO Sep 20, 2018 08:17
Subjective


Date Seen by a Provider:  Sep 20, 2018


Time Seen by a Provider:  08:19


Subjective/Events-last exam


Patient feeling well.  No abdominal pain.  Wanting to go home.


Not performing basic needs on her own. No family at bedside.


Patient not wanting to go to nursing home.





Objective


Exam





Vital Signs








  Date Time  Temp Pulse Resp B/P (MAP) Pulse Ox O2 Delivery O2 Flow Rate FiO2


 


18 03:58 97.8 103 20 146/78 (100) 96 Room Air  


 


18 23:38 96.5 105 20 141/73 (95) 95 Room Air  


 


18 20:35 97.1 110 20 143/70 (94) 93 Room Air  


 


18 17:12  101 20  97 Room Air  


 


18 16:46 98.6 113 16 153/74 (100) 94 Room Air  


 


18 12:00 97.0 108 16 155/81 (105) 94 Room Air  


 


18 08:34      Room Air  














I & O 


 


 18





 07:00


 


Intake Total 1100 ml


 


Output Total 900 ml


 


Balance 200 ml





Capillary Refill : Less Than 3 SecondsLess Than 3 Seconds


General Appearance:  Chronically ill


HEENT:  Other (Dry oral mucosa)


Neck:  Non Tender


Respiratory:  Lungs Clear, No Respiratory Distress


Cardiovascular:  Regular Rate, Rhythm, No Murmur


Gastrointestinal:  distended, other (fluid wave, no significant tenderness)


Extremity:  Non Tender, No Pedal Edema


Neurologic/Psychiatric:  Alert


Skin:  Warm/Dry, Pallor


Lymphatic:  No Adenopathy





Results


Lab


Laboratory Tests


18 18:56: Glucometer 79


18 05:55: 


White Blood Count 7.8, Red Blood Count 4.28L, Hemoglobin 12.6, Hematocrit 36, 

Mean Corpuscular Volume 84, Mean Corpuscular Hemoglobin 29, Mean Corpuscular 

Hemoglobin Concent 35, Red Cell Distribution Width 20.7H, Platelet Count 177, 

Mean Platelet Volume 10.0, Neutrophils (%) (Auto) 71, Lymphocytes (%) (Auto) 19

, Monocytes (%) (Auto) 10, Eosinophils (%) (Auto) 0, Basophils (%) (Auto) 0, 

Neutrophils # (Auto) 5.5, Lymphocytes # (Auto) 1.5, Monocytes # (Auto) 0.8, 

Eosinophils # (Auto) 0.0, Basophils # (Auto) 0.0, Sodium Level 137, Potassium 

Level 3.6, Chloride Level 106, Carbon Dioxide Level 16L, Anion Gap 15H, Blood 

Urea Nitrogen 36H, Creatinine 1.09, Estimat Glomerular Filtration Rate 51, BUN/

Creatinine Ratio 33, Glucose Level 90, Calcium Level 9.1





Microbiology


9/15/18 Blood Culture - Preliminary, Resulted


          No growth


9/15/18 Gram Stain - Final, Complete


          


9/15/18 Body Fluid Culture - Final, Complete


          No growth





Assessment/Plan


LUQ Abdominal pain-improved


ascites


gastritis and antral mass/inflammatory changes path ulceration and consistent 

with hemangioma


cirrhosis


Consideration sbp and asked to do paracentesis which was performed and took off 

1860 mL of straw colored fluid.


Protonix 40 mg bid IV


IV abx to cover for sbp 


 feeling better


patient unable to fulfil basic needs concerned if goes home, likely would 

benefit from nursing home.


will sign off, call if needed.


]





Clinical Quality Measures


DVT/VTE Risk/Contraindication:


Risk Factor Score Per Nursin


RFS Level Per Nursing on Admit:  4+=Very High











DAVID ARANDA DO Sep 20, 2018 08:35
no chest pain and no edema.

## 2022-11-10 NOTE — OCCUPATIONAL THERAPY EVAL
OT Evaluation-General/PLF


Medical Diagnosis


Admission Date


Sep 21, 2018 at 03:28


Medical Diagnosis:  AMS/abdominal pain


Onset Date:  Sep 20, 2018





Therapy Diagnosis


Therapy Diagnosis:  decr self care, weakness, decr funct mobility, AMS





Height/Weight


Height (Feet):  5


Height (Inches):  2.00


Weight (Pounds):  226


Weight (Ounces):  0.0





Precautions


Precautions/Isolations:  Fall Prevention, Standard Precautions


Safety Interventions:  Bed Exit Alarm, Reorient-PRN





Referral


Physician:  Iraida


Referral Reason:  Evaluation/Treatment





Medical History


Pertinent Medical History:  Arthritis, COPD, DM, HTN, Rheumatoid Arthritis


Additional Medical History


Hepatitis B. Urinary frequency, incontinence.DJD. osteoporosis. Fibromyalgia. 

Chronic back pain. Sleep difficulties. Anxiety. PTSD, violent behavior, 

depression.


Current History


Recent hospitalization for sepsis but with poor participation in therapy. 

Discharged to home yesterday but unable to walk and had to be pulled into house 

on a sheet. Incontinent.


Reviewed History:  Yes





Social History


Home:  Single Level


Current Living Status:  Other Family





ADL-Prior Level of Function


ADL PLOF Comments


Pt was unable to give history on prior functional status for basic ADLs. 

Medical record indicated that she stayed in bed most of the time and might get 

up to toilet.





OT Current Status


Subjective


Pt seen in room, up in bed, limited participation with OT





Appearance


Difficulty keeping pt awake to participate or to answer questions.





Mental Status/Objective


Attachments:  Lynne Catheter, IV





Current


Upper Extremity ROM


Pt moved arms spontaneously to over her head but UEs were otherwise either limp 

or in elbow extension. Pt responded with grasp with R UE but not on L UE.





ADL-Treatment


ADL-Current


Pt with difficulty waking up and staying awake. Periodically opened eyes. Did 

not answer questions other than to indicate she did not want more jello. Opened 

eyes, held a drink with L hand, refused to feed herself jello. Limited 

participation. She would not participate with PT in sitting edge of bed


              Functional [place] Measure


0=Not Assessed/NA   4=Minimal Assistance


1=Total Assistance   5=Supervision or Setup


2=Maximal Assistance   6=Modified [place]


3=Moderate Assistance   7=Complete IndependenceIRFPAI Quality Coding Scale











6 Independent with activity with or without an assistive device


 


5  Patient requires set up or clean up by helper.  Patient completes activity  

by  themselves


 


4 Supervision or touching assist (CGA). [place] provide cues , steadying assist


 


3 The helper provides less than half the effort to complete the activity


 


2 The helper provides more than half the effort to complete the activity


 


1 Dependent.  The helper does all the effort to complete an activity 


 


7 Patient refused to complete or attempt activity


 


9 The patient did not perform the activity before the current illness or injury


 


88 Not attempted due to Medical conditions or safety concerns











Education


OT Patient Education:  Purpose of tx/functional activities


Teaching Recipient:  Patient


Teaching Methods:  Discussion


Response to Teaching:  Reinforcement Needed





OT Long Term Goals


Long Term Goals


Time Frame:  Sep 28, 2018


Eating (FIM):  5


Grooming(FIM):  5


Bathing(FIM):  5


Upper Body Dressing(FIM):  5


Lower Body Dressing(FIM):  5


Toileting(FIM):  5


Toilet/Commode Transfer(FIM):  5


Shower Transfer(FIM):  5


Additional Goals:  1-Demonstrate ADL Tasks, 2-Verbalize Understanding, 3-

ImproveStrength/Susan


1=Demonstrate adherence to instructed precautions during ADL tasks.


2=Patient will verbalize/demonstrate understanding of assistive devices/

modifications for ADL.


3=Patient will improve strength/tolerance for activity to enable patient to 

perform ADL's.





OT Education/Plan


Problem List/Assessment


Assessment:  Decreased UE Strength, Dependent Transfers, Impaired Cognition, 

Impaired Self-Care Skills


Pt would benefit from skilled OT to increase her independence in basic self 

care but benefit will be determined on her willingness and ability to 

participate





Discharge Recommendations


Plan/Recommendations:  Continue POC


Therapy D/C Recommendations:  Skilled Nursing (TCU/[facility])





Treatment Plan/Plan of Care


Treatment,Training & Education:  Yes


Patient would benefit from OT for education, treatment and training to promote 

independence in ADL's, mobility, safety and/or upper extremity function for ADL'

s.


Plan of Care:  ADL Retraining, Functional Mobility, UE Funct Exercise/Act, UE 

Neuromus Re-Ed/Coord


Treatment Duration:  Sep 28, 2018


Frequency:  5 times per week


Estimated Hrs Per Day:  .25 hour per day


Agreement:  Yes


Rehab Potential:  Guarded





Time/GCodes


Start Time:  13:10


Stop Time:  13:20


Total Time Billed (hr/min):  10


Billed Treatment Time


visit, 10 minutes evaluation moderate intensity





PT/OT Therapy GCodes


Therapy


Functional Limitation:  Physical Therapy


Test(s)/Tool used to determine:  FIM





Functional Limitation-Current


Charge Code:  MARIA ESTHER


Modifier:  CN





Functional Limitation-Goal


Charge Code:  HOPE


Modifier:  ABENA NIETO OT Sep 21, 2018 13:35 Bathing allowed

## 2025-01-24 NOTE — ED GENERAL
General


Chief Complaint:  Abdominal/GI Problems


Stated Complaint:  ABD PAIN


Nursing Triage Note:  


PT WAS DISCHARGED FROM [facility] FOR ABDOMINAL PAIN. PT COMES TO ER NOW 


FOR ABDOMINAL PAIN. FAMILY STATES THAT SHE IS NOT ACTING HERSELF AND HER 

ABDOMEN 


APPEARS MORE SWOLLEN TO FAMILY THAN NORMAL. NO FAMILY PRESENT OR COMING TO ER 

TO 


SPEAK TO PHYSICIANS.


Nursing Sepsis Screen:  No Definite Risk


Source of Information:  Patient


Exam Limitations:  No Limitations





History of Present Illness


Date Seen by Provider:  Sep 21, 2018


Time Seen by Provider:  00:30


Initial Comments


This 58-year-old woman presents to the emergency room via EMS with complaints 

of abdominal pain and altered mental status.  She was dismissed from the 

hospital yesterday after being treated for sepsis with suspected bacterial 

peritonitis.  She had multiple procedures done during her admission including 

paracentesis which showed no evidence of infection upon body fluid analysis.  

However, paracentesis was done after antibiotics were started.  Patient also 

had an EGD performed with biopsy of an antral mass.  Pathology demonstrated 

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  Patient was treated with Rocephin during her 

hospital stay.  Blood cultures grew strep pneumoniae.  Per EMS, family notes a 

significant change in mental status.  Patient seems confused and is not wanting 

to answer questions.





Patient's daughter was later contacted.  She reports that patient has had 

altered mental status for the past several days.  Patient declined placement 

for rehabilitation services and was therefore discharged home yesterday.  

Family notes that after she returned home she was unable to bear weight or 

walk.  They had to lay her on a sheet and dragged her into the home as they're 

only means of getting her into the house.  She was also incontinent at home.  

They report that she continues to seem weak and confused.  She has also 

complained of abdominal pain at home.





Allergies and Home Medications


Allergies


Coded Allergies:  


     Penicillins (Verified  Allergy, Unknown, 9/15/18)


     doxycycline (Verified  Allergy, Unknown, 9/15/18)


     levofloxacin (Verified  Allergy, Unknown, 9/15/18)





Home Medications


Adalimumab 40 Mg/0.4 Ml Pen.ij.kit, 40 MG SQ Sa, (Reported)


Albuterol Sulfate 1 Puff Puff, 2 PUFF IH TID PRN for SHORTNESS OF BREATH, (

Reported)


   1 PUFF = 90 MCG 


Amitriptyline HCl 50 Mg Tablet, 50 MG PO HS, (Reported)


Aspirin 81 Mg Tablet.dr, 81 MG PO DAILY, (Reported)


Buspirone HCl 15 Mg Tablet, 15 MG PO BID, (Reported)


Cefdinir 300 Mg Capsule, 300 MG PO BID


   Prescribed by: JOHN KHOURY on 18 1150


Donepezil HCl 10 Mg Tablet, 10 MG PO DAILY, (Reported)


Hydrocodone/Acetaminophen 1 Each Tablet, 1 TAB PO QID PRN for PAIN-MODERATE, (

Reported)


Leflunomide 20 Mg Tablet, 20 MG PO Q48H, (Reported)


Losartan Potassium 25 Mg Tablet, 25 MG PO DAILY, (Reported)


Lovastatin 40 Mg Tablet, 40 MG PO HS, (Reported)


Pantoprazole Sodium 20 Mg Tablet.dr, 20 MG PO DAILY, (Reported)


Promethazine HCl 25 Mg Tablet, 25 MG PO Q6H PRN for NAUSEA/VOMITING-1ST LINE, (

Reported)


Rizatriptan Benzoate 10 Mg Tablet, 10 MG PO UD PRN for MIGRAINE, (Reported)


Sertraline HCl 100 Mg Tablet, 150 MG PO DAILY, (Reported)


   TAKES 1 & 1/2 (100MG) TABLETS 





Patient Home Medication List


Home Medication List Reviewed:  Yes





Review of Systems


Review of Systems


Constitutional:  no symptoms reported


EENTM:  see HPI


Respiratory:  no symptoms reported


Cardiovascular:  no symptoms reported


Gastrointestinal:  see HPI


Genitourinary:  no symptoms reported


Pregnant:  No


Musculoskeletal:  no symptoms reported


Skin:  no symptoms reported


Psychiatric/Neurological:  See HPI


Hematologic/Lymphatic:  No Symptoms Reported


Immunological/Allergic:  no symptoms reported





Past Medical-Social-Family Hx


Patient Social History


Alcohol Use:  Denies Use


Recreational Drug Use:  Yes


Drug of Choice:  MARIJUANA


Type Used:  Cigarettes


Recent Foreign Travel:  No


Contact w/Someone Who Travel:  No


Recent Infectious Disease Expo:  No


Recent Hopitalizations:  Yes (2018)


Physical Abuse:  No


Sexual Abuse:  No





Seasonal Allergies


Seasonal Allergies:  No





Past Medical History


Surgeries:  Yes (BI LAT KNEES, RT FOOT, NECK)


 Section, Orthopedic


Respiratory:  Yes


COPD


Cardiac:  Yes


Hypertension


Neurological:  Yes


Genitourinary:  Yes (FREQUENCY)


Gastrointestinal:  Yes (Hepatitis B)


Hepatitis


Musculoskeletal:  Yes


Degenerate Disk Disease, Osteoporosis, Arthritis, Fibromyalgia, Rheumatoid 

Arthritis, Chronic Back Pain


Endocrine:  Yes (IDDM PER FAMILY)


Diabetes, Insulin dep


HEENT:  No


Loss of Vision:  Denies


Hearing Impairment:  Denies


Cancer:  No


Psychosocial:  Yes (PER FAMILY)


Sleep Difficulties, Anxiety, PTSD, Violent Behavior, Depression


Integumentary:  Yes


Eczema





Physical Exam


Vital Signs





Vital Signs - First Documented








 18





 00:25 04:36


 


Temp 97.6 


 


Pulse 82 


 


Resp 14 


 


B/P (MAP) 142/88 (106) 


 


Pulse Ox 100 


 


O2 Delivery  Room Air





Capillary Refill : Less Than 3 Seconds


Height, Weight, BMI


Height: 5'2.00"


Weight: 226lbs. 0.0oz. [id number]; 39.5 BMI


Method:Stated


General Appearance:  WD/WN, Mild Distress


HEENT:  PERRL/EOMI, Normal ENT Inspection


Neck:  Normal Inspection


Respiratory:  Lungs Clear, Normal Breath Sounds, No Accessory Muscle Use, No 

Respiratory Distress


Cardiovascular:  No Edema, No Murmur, Tachycardia


Gastrointestinal:  Normal Bowel Sounds, Soft, Tenderness (Gen. a central 

abdominal tenderness, mild)


Extremity:  Normal Inspection, No Pedal Edema


Neurologic/Psychiatric:  Alert, No Motor/Sensory Deficits, Other (disoriented 

to place and month.  Oriented to age)


Skin:  Normal Color, Warm/Dry





Focused Exam


Lactate Level


18 00:44: Lactic Acid Level 1.28





Lactic Acid Level








Progress/Results/Core Measures


Suspected Sepsis


Recent Fever Within 48 Hours:  No


Infection Criteria Present:  None


New/Unexplained  Altered Menta:  No


Sepsis Screen:  No Definite Risk


SIRS


Temperature:97.6 


Pulse: 82 


Respiratory Rate: 14


 


Laboratory Tests


18 00:44: White Blood Count 8.4


Blood Pressure 142 /88 


Mean: 106


 


18 00:44: Lactic Acid Level 1.28


Laboratory Tests


18 00:44: 


Creatinine 1.07, INR Comment 1.8H, Platelet Count 205, Total Bilirubin 1.5H








Results/Orders


Lab Results





Laboratory Tests








Test


 18


00:44 18


01:04 18


01:30 Range/Units


 


 


White Blood Count


 8.4 


 


 


 4.3-11.0


10^3/uL


 


Red Blood Count


 4.31 L


 


 


 4.35-5.85


10^6/uL


 


Hemoglobin 12.3    11.5-16.0  G/DL


 


Hematocrit 36    35-52  %


 


Mean Corpuscular Volume 83    80-99  FL


 


Mean Corpuscular Hemoglobin 29    25-34  PG


 


Mean Corpuscular Hemoglobin


Concent 34 


 


 


 32-36  G/DL





 


Red Cell Distribution Width 20.8 H   10.0-14.5  %


 


Platelet Count


 205 


 


 


 130-400


10^3/uL


 


Mean Platelet Volume 9.4    7.4-10.4  FL


 


Neutrophils (%) (Auto) 78 H   42-75  %


 


Lymphocytes (%) (Auto) 13    12-44  %


 


Monocytes (%) (Auto) 9    0-12  %


 


Eosinophils (%) (Auto) 0    0-10  %


 


Basophils (%) (Auto) 0    0-10  %


 


Neutrophils # (Auto) 6.6    1.8-7.8  X 10^3


 


Lymphocytes # (Auto) 1.1    1.0-4.0  X 10^3


 


Monocytes # (Auto) 0.8    0.0-1.0  X 10^3


 


Eosinophils # (Auto)


 0.0 


 


 


 0.0-0.3


10^3/uL


 


Basophils # (Auto)


 0.0 


 


 


 0.0-0.1


10^3/uL


 


Prothrombin Time 21.1 H   12.2-14.7  SEC


 


INR Comment 1.8 H   0.8-1.4  


 


Sodium Level 138    135-145  MMOL/L


 


Potassium Level 3.7    3.6-5.0  MMOL/L


 


Chloride Level 106      MMOL/L


 


Carbon Dioxide Level 17 L   21-32  MMOL/L


 


Anion Gap 15 H   5-14  MMOL/L


 


Blood Urea Nitrogen 37 H   7-18  MG/DL


 


Creatinine


 1.07 


 


 


 0.60-1.30


MG/DL


 


Estimat Glomerular Filtration


Rate 52 


 


 


  





 


BUN/Creatinine Ratio 35     


 


Glucose Level 83      MG/DL


 


Lactic Acid Level


 1.28 


 


 


 0.50-2.00


MMOL/L


 


Calcium Level 9.4    8.5-10.1  MG/DL


 


Corrected Calcium 10.0    8.5-10.1  MG/DL


 


Total Bilirubin 1.5 H   0.1-1.0  MG/DL


 


Aspartate Amino Transf


(AST/SGOT) 76 H


 


 


 5-34  U/L





 


Alanine Aminotransferase


(ALT/SGPT) 58 H


 


 


 0-55  U/L





 


Alkaline Phosphatase 122      U/L


 


C-Reactive Protein High


Sensitivity 7.12 H


 


 


 0.00-0.50


MG/DL


 


Total Protein 7.4    6.4-8.2  GM/DL


 


Albumin 3.2    3.2-4.5  GM/DL


 


Lipase 137 H   8-78  U/L


 


Ammonia  53 H  11-32  UMOL/L


 


Urine Color   BILLY H  


 


Urine Clarity   VERY CLOUDY H  


 


Urine pH   6  5-9  


 


Urine Specific Gravity   1.020  1.016-1.022  


 


Urine Protein   2+ H NEGATIVE  


 


Urine Glucose (UA)   NEGATIVE  NEGATIVE  


 


Urine Ketones   2+ H NEGATIVE  


 


Urine Nitrite   NEGATIVE  NEGATIVE  


 


Urine Bilirubin   NEGATIVE  NEGATIVE  


 


Urine Urobilinogen   NORMAL  NORMAL  MG/DL


 


Urine Leukocyte Esterase   2+ H NEGATIVE  


 


Urine RBC (Auto)   1+ H NEGATIVE  


 


Urine RBC   NONE   /HPF


 


Urine WBC   2-5   /HPF


 


Urine Squamous Epithelial


Cells 


 


 >50 H


  /HPF





 


Urine Crystals   NONE   /LPF


 


Urine Bacteria   TRACE   /HPF


 


Urine Casts   NONE   /LPF


 


Urine Mucus   SMALL H  /LPF


 


Urine Yeast   MODERATE H  /HPF


 


Urine Culture Indicated   YES   








My Orders





Orders - BILL WALL MD


Cbc With Automated Diff (18 00:47)


Comprehensive Metabolic Panel (18 00:47)


Hs C Reactive Protein (18 00:47)


Ua Culture If Indicated (18 00:47)


Saline Lock/Iv-Start (18 00:47)


Chest 1 View, Ap/Pa Only (18 00:47)


Lipase (18 00:47)


Ammonia (18 01:00)


Blood Culture (18 01:21)


Lactic Acid Analyzer (18 01:21)


Protime With Inr (18 01:45)


Urine Culture (18 01:30)





Vital Signs/I&O











 18





 00:25 01:33 03:58 04:36


 


Temp 97.6 97.6 97.6 97.8


 


Pulse 82 82 69 109


 


Resp 14 14 12 20


 


B/P (MAP) 142/88 (106) 142/88 140/86 163/85 (111)


 


Pulse Ox 100 100 100 94


 


O2 Delivery    Room Air





Capillary Refill : Less Than 3 Seconds








Blood Pressure Mean:  106











Diagnostic Imaging





   Diagonstic Imaging:  Xray


   Plain Films/CT/US/NM/MRI:  chest


Comments


Chest x-ray viewed by me.  Report not yet available.  Compared with prior.  No 

acute abnormalities appreciated.





Departure


Communication (Admissions)


Time/Spoke to Admitting Phy:  02:35


Case was discussed with Dr. Bello.  Does not appear safe to return this 

patient home as it appears family will have difficulty adequately caring for 

her.  We therefore believe admitting her is the best option at present.  Dr. Mcgovern requested that I not right orders for antibiotics, lactulose, or other 

medical therapies.  He would like Dr. Khoury to review the case.


Time/Spoke to Consulting Phy:  02:40


Dr. Phelps was notified of consultation by message.





Impression





 Primary Impression:  


 Altered mental status


 Qualified Codes:  R41.82 - Altered mental status, unspecified


 Additional Impressions:  


 Generalized weakness


 Abdominal pain


 Qualified Codes:  R10.84 - Generalized abdominal pain


 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


Disposition:   ADMITTED AS INPATIENT


Condition:  Stable





Admissions


Decision to Admit Reason:  Admit from ER (General)


Decision to Admit/Date:  Sep 21, 2018


Time/Decision to Admit Time:  02:35





Departure-Patient Inst.


Referrals:  


NO,LOCAL PHYSICIAN (PCP/Family)


Primary Care Physician











BILL WALL MD Sep 21, 2018 01:54
dentures